# Patient Record
Sex: MALE | Race: OTHER | NOT HISPANIC OR LATINO | URBAN - METROPOLITAN AREA
[De-identification: names, ages, dates, MRNs, and addresses within clinical notes are randomized per-mention and may not be internally consistent; named-entity substitution may affect disease eponyms.]

---

## 2019-08-13 ENCOUNTER — OUTPATIENT (OUTPATIENT)
Dept: OUTPATIENT SERVICES | Facility: HOSPITAL | Age: 59
LOS: 1 days | End: 2019-08-13
Payer: MEDICAID

## 2019-08-13 VITALS
OXYGEN SATURATION: 99 % | WEIGHT: 147.93 LBS | RESPIRATION RATE: 14 BRPM | SYSTOLIC BLOOD PRESSURE: 102 MMHG | TEMPERATURE: 97 F | DIASTOLIC BLOOD PRESSURE: 70 MMHG | HEART RATE: 63 BPM | HEIGHT: 70 IN

## 2019-08-13 DIAGNOSIS — K40.20 BILATERAL INGUINAL HERNIA, WITHOUT OBSTRUCTION OR GANGRENE, NOT SPECIFIED AS RECURRENT: ICD-10-CM

## 2019-08-13 DIAGNOSIS — E11.9 TYPE 2 DIABETES MELLITUS WITHOUT COMPLICATIONS: ICD-10-CM

## 2019-08-13 LAB
ANION GAP SERPL CALC-SCNC: 15 MMO/L — HIGH (ref 7–14)
BUN SERPL-MCNC: 11 MG/DL — SIGNIFICANT CHANGE UP (ref 7–23)
CALCIUM SERPL-MCNC: 10.1 MG/DL — SIGNIFICANT CHANGE UP (ref 8.4–10.5)
CHLORIDE SERPL-SCNC: 97 MMOL/L — LOW (ref 98–107)
CO2 SERPL-SCNC: 26 MMOL/L — SIGNIFICANT CHANGE UP (ref 22–31)
CREAT SERPL-MCNC: 0.6 MG/DL — SIGNIFICANT CHANGE UP (ref 0.5–1.3)
GLUCOSE SERPL-MCNC: 131 MG/DL — HIGH (ref 70–99)
HBA1C BLD-MCNC: 7.9 % — HIGH (ref 4–5.6)
HCT VFR BLD CALC: 43.6 % — SIGNIFICANT CHANGE UP (ref 39–50)
HGB BLD-MCNC: 14.9 G/DL — SIGNIFICANT CHANGE UP (ref 13–17)
MCHC RBC-ENTMCNC: 27.9 PG — SIGNIFICANT CHANGE UP (ref 27–34)
MCHC RBC-ENTMCNC: 34.2 % — SIGNIFICANT CHANGE UP (ref 32–36)
MCV RBC AUTO: 81.6 FL — SIGNIFICANT CHANGE UP (ref 80–100)
NRBC # FLD: 0 K/UL — SIGNIFICANT CHANGE UP (ref 0–0)
PLATELET # BLD AUTO: 232 K/UL — SIGNIFICANT CHANGE UP (ref 150–400)
PMV BLD: 9.6 FL — SIGNIFICANT CHANGE UP (ref 7–13)
POTASSIUM SERPL-MCNC: 3.7 MMOL/L — SIGNIFICANT CHANGE UP (ref 3.5–5.3)
POTASSIUM SERPL-SCNC: 3.7 MMOL/L — SIGNIFICANT CHANGE UP (ref 3.5–5.3)
RBC # BLD: 5.34 M/UL — SIGNIFICANT CHANGE UP (ref 4.2–5.8)
RBC # FLD: 12.7 % — SIGNIFICANT CHANGE UP (ref 10.3–14.5)
SODIUM SERPL-SCNC: 138 MMOL/L — SIGNIFICANT CHANGE UP (ref 135–145)
WBC # BLD: 6.16 K/UL — SIGNIFICANT CHANGE UP (ref 3.8–10.5)
WBC # FLD AUTO: 6.16 K/UL — SIGNIFICANT CHANGE UP (ref 3.8–10.5)

## 2019-08-13 PROCEDURE — 93010 ELECTROCARDIOGRAM REPORT: CPT

## 2019-08-13 NOTE — H&P PST ADULT - NEGATIVE OPHTHALMOLOGIC SYMPTOMS
no photophobia/no pain R/no loss of vision L/no discharge L/no lacrimation R/no blurred vision L/no discharge R/no pain L/no irritation L/no irritation R/no loss of vision R/no lacrimation L/no blurred vision R/no diplopia

## 2019-08-13 NOTE — H&P PST ADULT - NEGATIVE GENERAL GENITOURINARY SYMPTOMS
no hematuria/normal urinary frequency/no renal colic/no urinary hesitancy/no urine discoloration/no flank pain R/no incontinence/no flank pain L/no gas in urine/no bladder infections/no dysuria normal urinary frequency/no nocturia/no urinary hesitancy/no flank pain R/no bladder infections/no urine discoloration/no flank pain L/no incontinence/no dysuria/no hematuria/no renal colic/no gas in urine

## 2019-08-13 NOTE — H&P PST ADULT - HISTORY OF PRESENT ILLNESS
59 y/o male with PMH: DMtype2, Dyslipidemia presents to PST for pre op evaluation with h/o left inguinal hernia x 15 years.  Pt c/o mild pain ans increased swelling after meals. Now scheduled for repair left scrotal hernia repair with mesh on 08/19/19

## 2019-08-13 NOTE — H&P PST ADULT - NEGATIVE CARDIOVASCULAR SYMPTOMS
no orthopnea/no palpitations/no claudication/no chest pain/no dyspnea on exertion/no paroxysmal nocturnal dyspnea/no peripheral edema

## 2019-08-13 NOTE — H&P PST ADULT - RS GEN PE MLT RESP DETAILS PC
respirations non-labored/no chest wall tenderness/no intercostal retractions/good air movement/no wheezes/airway patent/no rhonchi/breath sounds equal/clear to auscultation bilaterally/no rales

## 2019-08-13 NOTE — H&P PST ADULT - NEGATIVE GENERAL SYMPTOMS
no anorexia/no weight gain/no weight loss/no polyphagia/no polydipsia/no fatigue/no chills/no malaise

## 2019-08-13 NOTE — H&P PST ADULT - NSICDXPROBLEM_GEN_ALL_CORE_FT
PROBLEM DIAGNOSES  Problem: Bilateral inguinal hernia, without obstruction or gangrene, not specified as recurrent  Assessment and Plan: Scheduled for repair left scrotal hernia repair with mesh on 08/19/19. Pre op instructions, famotidine, chlorhexidine gluconate soap given and explained. Pt verbalized understanding.     Problem: Diabetes mellitus  Assessment and Plan: Monitor BS on day of surgery  Pt instructed to hold metformin 24 hours and Farxiga 48 hours prior to surgery. Also instructed not to take any diabetes medications on day of surgery. Pt verbalized understanding.

## 2019-08-13 NOTE — H&P PST ADULT - NEGATIVE GASTROINTESTINAL SYMPTOMS
no diarrhea/no jaundice/no nausea/no vomiting/no constipation/no hiccoughs no hiccoughs/no vomiting/no constipation/no diarrhea/no change in bowel habits/no nausea/no abdominal pain/no melena/no jaundice

## 2019-08-16 RX ORDER — SODIUM CHLORIDE 9 MG/ML
1000 INJECTION, SOLUTION INTRAVENOUS
Refills: 0 | Status: DISCONTINUED | OUTPATIENT
Start: 2019-08-19 | End: 2019-09-03

## 2019-08-18 ENCOUNTER — TRANSCRIPTION ENCOUNTER (OUTPATIENT)
Age: 59
End: 2019-08-18

## 2019-08-19 ENCOUNTER — RESULT REVIEW (OUTPATIENT)
Age: 59
End: 2019-08-19

## 2019-08-19 ENCOUNTER — OUTPATIENT (OUTPATIENT)
Dept: OUTPATIENT SERVICES | Facility: HOSPITAL | Age: 59
LOS: 1 days | Discharge: HOME | End: 2019-08-19
Payer: MEDICAID

## 2019-08-19 VITALS
OXYGEN SATURATION: 98 % | HEIGHT: 70 IN | TEMPERATURE: 98 F | RESPIRATION RATE: 15 BRPM | HEART RATE: 68 BPM | DIASTOLIC BLOOD PRESSURE: 72 MMHG | SYSTOLIC BLOOD PRESSURE: 100 MMHG | WEIGHT: 147.93 LBS

## 2019-08-19 VITALS
RESPIRATION RATE: 14 BRPM | DIASTOLIC BLOOD PRESSURE: 70 MMHG | HEART RATE: 78 BPM | OXYGEN SATURATION: 99 % | SYSTOLIC BLOOD PRESSURE: 126 MMHG

## 2019-08-19 DIAGNOSIS — K40.20 BILATERAL INGUINAL HERNIA, WITHOUT OBSTRUCTION OR GANGRENE, NOT SPECIFIED AS RECURRENT: ICD-10-CM

## 2019-08-19 LAB — GLUCOSE BLDC GLUCOMTR-MCNC: 142 MG/DL — HIGH (ref 70–99)

## 2019-08-19 PROCEDURE — 88302 TISSUE EXAM BY PATHOLOGIST: CPT | Mod: 26

## 2019-08-19 RX ORDER — OXYCODONE HYDROCHLORIDE 5 MG/1
5 TABLET ORAL EVERY 4 HOURS
Refills: 0 | Status: DISCONTINUED | OUTPATIENT
Start: 2019-08-19 | End: 2019-08-19

## 2019-08-19 RX ORDER — SODIUM CHLORIDE 9 MG/ML
1000 INJECTION, SOLUTION INTRAVENOUS
Refills: 0 | Status: DISCONTINUED | OUTPATIENT
Start: 2019-08-19 | End: 2019-09-03

## 2019-08-19 NOTE — ASU DISCHARGE PLAN (ADULT/PEDIATRIC) - CARE PROVIDER_API CALL
Peña Joya)  Surgery  [address] [place] [address]  Phone: [phone]  Fax: [phone]  Follow Up Time: 1 week    Wilman Ambrocio  Phone: [phone]  Fax: (   )    -  Follow Up Time: 1 week

## 2019-08-19 NOTE — ASU DISCHARGE PLAN (ADULT/PEDIATRIC) - CALL YOUR DOCTOR IF YOU HAVE ANY OF THE FOLLOWING:
Swelling that gets worse/Excessive diarrhea/Bleeding that does not stop/Pain not relieved by Medications/Increased irritability or sluggishness/Numbness, tingling, color or temperature change to extremity/Fever greater than (need to indicate Fahrenheit or Celsius)/Nausea and vomiting that does not stop/Unable to urinate/Inability to tolerate liquids or foods/Wound/Surgical Site with redness, or foul smelling discharge or pus

## 2019-08-19 NOTE — ASU DISCHARGE PLAN (ADULT/PEDIATRIC) - MEDICATION INSTRUCTIONS
You received IV Tylenol for pain management at 4:30PM. Please DO NOT take any Tylenol (Acetaminophen) containing products, such as Vicodin, Percocet, Excedrin, and cold medications for the next 6 hours until 10:30PM. DO NOT TAKE MORE THAN 3000 MG OF TYLENOL in a 24 hour period.

## 2019-08-19 NOTE — ASU DISCHARGE PLAN (ADULT/PEDIATRIC) - PROVIDER TOKENS
PROVIDER:[TOKEN:[[id number]],FOLLOWUP:[1 week]],FREE:[LAST:[Ambrocio],PHONE:[phone]],FAX:[(   )    -],ADDRESS:[[facility]],FOLLOWUP:[1 week]]

## 2019-08-19 NOTE — ASU DISCHARGE PLAN (ADULT/PEDIATRIC) - NURSING INSTRUCTIONS
You received IV Tylenol for pain management at 4:30PM. Please DO NOT take any Tylenol (Acetaminophen) containing products, such as Vicodin, Percocet, Excedrin, and cold medications for the next 6 hours until 10:30PM. DO NOT TAKE MORE THAN 3000 MG OF TYLENOL in a 24 hour period. You received IV Tylenol for pain management at 4:30PM. Please DO NOT take any Tylenol (Acetaminophen) containing products, such as Vicodin, Percocet, Excedrin, and cold medications for the next 6 hours until 10:30PM. DO NOT TAKE MORE THAN 3000 MG OF TYLENOL in a 24 hour period.  You received IV Toradol for pain management at 4pm. Please DO NOT take Motrin/Ibuprofen/Advil/Aleve/NSAIDs (Non-Steroidal Anti-Inflammatory Drugs) for the next 6 hours until 10PM.

## 2019-08-19 NOTE — BRIEF OPERATIVE NOTE - OPERATION/FINDINGS
left inguinal hernia repair with mesh  Left testicle undescended, intraoperative urology consult, brought as far down to scrotum as we could, put stitch through to keep in place. Plan to follow w/ urology

## 2019-08-19 NOTE — ASU DISCHARGE PLAN (ADULT/PEDIATRIC) - ASU DC SPECIAL INSTRUCTIONSFT
WOUND CARE:  Please keep incisions clean and dry. Please do not Scrub or rub incisions. Do not use lotion or powder on incisions. Please leave your steri strips on, they will fall off by themselves.   BATHING: Please do not submerge wound underwater. You may shower and/or sponge bathe.  PAIN: Please take motrin and tylenol. Please take percocet as needed for severe pain.   ACTIVITY: No heavy lifting or straining. Otherwise, you may return to your usual level of physical activity. If you are taking narcotic pain medication (such as Percocet) DO NOT drive a car, operate machinery or make important decisions.  DIET: Return to your usual diet.  NOTIFY YOUR SURGEON IF: You have any bleeding that does not stop, any pus draining from your wound(s), any fever (over 100.4 F) or chills, persistent nausea/vomiting, persistent diarrhea, or if your pain is not controlled on your discharge pain medications.  FOLLOW-UP: . Please follow-up with your surgeon, within 7 days following discharge- please call to schedule an appointment. Please call the [facility] and ask for Dr. Ambrocio to follow w/ a urologist.

## 2019-08-20 PROBLEM — E78.5 HYPERLIPIDEMIA, UNSPECIFIED: Chronic | Status: ACTIVE | Noted: 2019-08-13

## 2019-08-26 PROBLEM — Z00.00 ENCOUNTER FOR PREVENTIVE HEALTH EXAMINATION: Status: ACTIVE | Noted: 2019-08-26

## 2019-08-27 ENCOUNTER — APPOINTMENT (OUTPATIENT)
Dept: UROLOGY | Facility: CLINIC | Age: 59
End: 2019-08-27
Payer: MEDICAID

## 2019-08-27 VITALS
BODY MASS INDEX: 21.37 KG/M2 | HEIGHT: 68 IN | DIASTOLIC BLOOD PRESSURE: 82 MMHG | TEMPERATURE: 97.8 F | SYSTOLIC BLOOD PRESSURE: 121 MMHG | HEART RATE: 73 BPM | WEIGHT: 141 LBS

## 2019-08-27 DIAGNOSIS — Q53.10 UNSPECIFIED UNDESCENDED TESTICLE, UNILATERAL: ICD-10-CM

## 2019-08-27 DIAGNOSIS — R35.0 FREQUENCY OF MICTURITION: ICD-10-CM

## 2019-08-27 PROCEDURE — 51798 US URINE CAPACITY MEASURE: CPT

## 2019-08-27 PROCEDURE — 99204 OFFICE O/P NEW MOD 45 MIN: CPT | Mod: 25

## 2019-08-27 RX ORDER — METFORMIN HYDROCHLORIDE 500 MG/1
500 TABLET, COATED ORAL
Refills: 0 | Status: ACTIVE | COMMUNITY

## 2019-08-28 LAB — SURGICAL PATHOLOGY STUDY: SIGNIFICANT CHANGE UP

## 2019-08-28 NOTE — PHYSICAL EXAM
[General Appearance - Well Developed] : well developed [General Appearance - Well Nourished] : well nourished [Normal Appearance] : normal appearance [Well Groomed] : well groomed [General Appearance - In No Acute Distress] : no acute distress [Edema] : no peripheral edema [Exaggerated Use Of Accessory Muscles For Inspiration] : no accessory muscle use [Respiration, Rhythm And Depth] : normal respiratory rhythm and effort [Abdomen Soft] : soft [Abdomen Tenderness] : non-tender [Urethral Meatus] : meatus normal [Costovertebral Angle Tenderness] : no ~M costovertebral angle tenderness [Scrotum] : the scrotum was normal [Urinary Bladder Findings] : the bladder was normal on palpation [Normal Station and Gait] : the gait and station were normal for the patient's age [] : no rash [No Focal Deficits] : no focal deficits [Oriented To Time, Place, And Person] : oriented to person, place, and time [Affect] : the affect was normal [Not Anxious] : not anxious [Mood] : the mood was normal [No Palpable Adenopathy] : no palpable adenopathy [FreeTextEntry1] : see consult letter

## 2019-08-28 NOTE — HISTORY OF PRESENT ILLNESS
[FreeTextEntry1] : Sheikh Jesus presents to the office today. He is a 58 year old man who had hernia surgery one week ago with Dr. Joya to repair a left inguinal hernia. At the time of surgery, apparently it was discovered that there was a high riding testicle in the sublingual space. The patient was told that the testicle was thought to be underdeveloped and undescended based on the surgeon's evaluation at the time of the procedure. It was apparently sutured in place from what he was told but was unable to be brought down into the scrotum. The patient had apparently known of an undescended or retractile testicle in the past. He has fathered 4 children and has no reported history of any infertility issues. He has also never felt any pain in the area. He was diagnosed with the abnormal testicular location in [place]. This issue has apparently been known for years by the patient, but was unknown to the surgeon prior to the hernia repair.\par \par The patient has some symptoms of urinary frequency to have developed since the operation. She denies any major baseline urinary complaints including urgency or frequency and has never been an issue of urinary retention. He does have some baseline post void dribbling.\par

## 2019-08-28 NOTE — REVIEW OF SYSTEMS
[Feeling Tired] : feeling tired [Constipation] : constipation [Strong urge to urinate] : strong urge to urinate [Bladder pressure] : experiences bladder pressure [Bladder fullness after urinating] : bladder fullness after urinating [Skin Wound] : skin wound [Feelings Of Weakness] : feelings of weakness [Negative] : Heme/Lymph [FreeTextEntry3] : dribbling of raulito

## 2019-08-28 NOTE — ASSESSMENT
[FreeTextEntry1] : Examination of the testicle today was difficult because the patient is currently one week status post left inguinal hernia surgery and a reported location of the testicle was just below the external inguinal ring on the left side. Examination today does show that he has a mass at the lower aspect of the hernia incision site which is likely the testicle. There is no palpable testicle in the scrotum on the left side. The right testicle feels to be in its normal position with normal lie and orientation. There is no mass lesion palpable. The mass in the left sublingual space is approximately the size of a normal testicle I would estimate to be approximately 25 cm³ in size.\par \par At the age of 58, it is highly unlikely that he has an issue of testicular cancer. We discussed the difference between undescended testicles and retractile testicles and that his history of a large inguinal hernia could have something to do with the fact that the testicle was in an abnormal location. A recommendation to him that would be that after she has recovered from the hernia surgery, he undergo evaluation with an ultrasound of the testicles to assess for any mass lesions. I think this is likely going to show normal findings.\par \par As he is recently postop with some changes in urinary symptoms, I did perform a residual bladder scan today which showed he is emptying well was 0 mL. The patient does wish to have intervention for the symptoms of the post void dribbling and for this reason I did prescribe him tamsulosin 0.4 mg.

## 2019-09-13 ENCOUNTER — APPOINTMENT (OUTPATIENT)
Dept: MRI IMAGING | Facility: IMAGING CENTER | Age: 59
End: 2019-09-13
Payer: MEDICAID

## 2019-09-13 ENCOUNTER — OUTPATIENT (OUTPATIENT)
Dept: OUTPATIENT SERVICES | Facility: HOSPITAL | Age: 59
LOS: 1 days | End: 2019-09-13
Payer: MEDICAID

## 2019-09-13 DIAGNOSIS — R19.8 OTHER SPECIFIED SYMPTOMS AND SIGNS INVOLVING THE DIGESTIVE SYSTEM AND ABDOMEN: ICD-10-CM

## 2019-09-13 PROCEDURE — 72197 MRI PELVIS W/O & W/DYE: CPT

## 2019-09-13 PROCEDURE — A9585: CPT

## 2019-09-13 PROCEDURE — 72197 MRI PELVIS W/O & W/DYE: CPT | Mod: 26

## 2019-09-27 ENCOUNTER — RX CHANGE (OUTPATIENT)
Age: 59
End: 2019-09-27

## 2019-10-20 ENCOUNTER — FORM ENCOUNTER (OUTPATIENT)
Age: 59
End: 2019-10-20

## 2019-10-21 ENCOUNTER — APPOINTMENT (OUTPATIENT)
Dept: ULTRASOUND IMAGING | Facility: IMAGING CENTER | Age: 59
End: 2019-10-21
Payer: MEDICAID

## 2019-10-21 ENCOUNTER — OUTPATIENT (OUTPATIENT)
Dept: OUTPATIENT SERVICES | Facility: HOSPITAL | Age: 59
LOS: 1 days | End: 2019-10-21
Payer: MEDICAID

## 2019-10-21 DIAGNOSIS — Q53.10 UNSPECIFIED UNDESCENDED TESTICLE, UNILATERAL: ICD-10-CM

## 2019-10-21 PROCEDURE — 76870 US EXAM SCROTUM: CPT

## 2019-10-21 PROCEDURE — 76870 US EXAM SCROTUM: CPT | Mod: 26

## 2019-10-22 ENCOUNTER — APPOINTMENT (OUTPATIENT)
Dept: UROLOGY | Facility: CLINIC | Age: 59
End: 2019-10-22
Payer: MEDICAID

## 2019-10-22 DIAGNOSIS — Q55.22 RETRACTILE TESTIS: ICD-10-CM

## 2019-10-22 PROCEDURE — 99213 OFFICE O/P EST LOW 20 MIN: CPT

## 2020-02-10 ENCOUNTER — OUTPATIENT (OUTPATIENT)
Dept: OUTPATIENT SERVICES | Facility: HOSPITAL | Age: 60
LOS: 1 days | End: 2020-02-10
Payer: MEDICAID

## 2020-02-10 ENCOUNTER — RESULT REVIEW (OUTPATIENT)
Age: 60
End: 2020-02-10

## 2020-02-10 DIAGNOSIS — K62.89 OTHER SPECIFIED DISEASES OF ANUS AND RECTUM: ICD-10-CM

## 2020-02-10 PROCEDURE — 88321 CONSLTJ&REPRT SLD PREP ELSWR: CPT

## 2020-02-11 ENCOUNTER — APPOINTMENT (OUTPATIENT)
Dept: SURGICAL ONCOLOGY | Facility: CLINIC | Age: 60
End: 2020-02-11
Payer: MEDICAID

## 2020-02-11 VITALS
RESPIRATION RATE: 15 BRPM | DIASTOLIC BLOOD PRESSURE: 80 MMHG | HEIGHT: 68 IN | WEIGHT: 150 LBS | BODY MASS INDEX: 22.73 KG/M2 | SYSTOLIC BLOOD PRESSURE: 126 MMHG | HEART RATE: 66 BPM

## 2020-02-11 DIAGNOSIS — K62.89 OTHER SPECIFIED DISEASES OF ANUS AND RECTUM: ICD-10-CM

## 2020-02-11 PROCEDURE — 99205 OFFICE O/P NEW HI 60 MIN: CPT

## 2020-02-14 ENCOUNTER — OUTPATIENT (OUTPATIENT)
Dept: OUTPATIENT SERVICES | Facility: HOSPITAL | Age: 60
LOS: 1 days | Discharge: HOME | End: 2020-02-14
Payer: MEDICAID

## 2020-02-14 ENCOUNTER — APPOINTMENT (OUTPATIENT)
Dept: GASTROENTEROLOGY | Facility: HOSPITAL | Age: 60
End: 2020-02-14

## 2020-02-14 VITALS
TEMPERATURE: 98 F | WEIGHT: 149.91 LBS | HEART RATE: 65 BPM | OXYGEN SATURATION: 99 % | DIASTOLIC BLOOD PRESSURE: 78 MMHG | HEIGHT: 69 IN | SYSTOLIC BLOOD PRESSURE: 116 MMHG | RESPIRATION RATE: 15 BRPM

## 2020-02-14 VITALS
SYSTOLIC BLOOD PRESSURE: 124 MMHG | RESPIRATION RATE: 18 BRPM | DIASTOLIC BLOOD PRESSURE: 78 MMHG | OXYGEN SATURATION: 100 % | HEART RATE: 64 BPM

## 2020-02-14 DIAGNOSIS — K62.89 OTHER SPECIFIED DISEASES OF ANUS AND RECTUM: ICD-10-CM

## 2020-02-14 PROCEDURE — 45391 COLONOSCOPY W/ENDOSCOPE US: CPT | Mod: GC

## 2020-02-14 RX ORDER — SODIUM CHLORIDE 9 MG/ML
1000 INJECTION, SOLUTION INTRAVENOUS
Refills: 0 | Status: DISCONTINUED | OUTPATIENT
Start: 2020-02-14 | End: 2020-03-04

## 2020-02-14 RX ADMIN — SODIUM CHLORIDE 50 MILLILITER(S): 9 INJECTION, SOLUTION INTRAVENOUS at 16:34

## 2020-02-17 NOTE — ASSESSMENT
[FreeTextEntry1] : Assessment: 60 y/o male with 3.5 cm by 1.4 CM lower rectal mass concerning for rectal adenocarcinoma but pathology inconsistent with necrosis and atypical cells. \par \par \par Plan: \par Will order serum CEA today, CT abd/pelvis to evaluate for distant metastasis.. Patient will need a repeat biopsy to confirm suspected diagnosis- Discussed with - recommends - discussed for scheduling. Pt has an appt to f/u with Dr. Caceres (medical oncology).\par \par I have discussed the diagnosis, therapeutic plan and options with the patient at length. Patient expressed verbal understanding and all questions answered.\par \par \par 2/17/20\par EUS- no definite tumor seen in the rectum\par Will repeat MRI pelvis to compare to 9/19 study.\par Discussed with pt \par RTO after MRI

## 2020-02-17 NOTE — HISTORY OF PRESENT ILLNESS
[de-identified] : Mr. Lee is a 59 year old male who presents today for an initial consultation for rectal mass.  He  was referred by Dr. Elena Romano. Pt is asymptomatic. Denies abd pain, nausea/vomiting, no blood per rectum. Denies weight loss, fever, or chills. Pt had MRI pelvis in 9/19- which showed a midrectal tumor T2/3 N-.  Pt underwent flex sig which revealed a rectal mass about 8 CM from anal verge. Subsequently underwent EUS biopsy which showed atypical cells.  Slides were reviewed at [facility] and also noted blood clot, foci of necrosis and rare cluster of cells with intracytoplasmic content.\par \par PMHx: otherwise significant for  DM and HLD\par PSHx: left inguinal hernia repair\par \par GI: Elena Parikh [phone]\par PCP: Blu Saha  [phone]\par Son (Blanca) #[phone]

## 2020-02-17 NOTE — REASON FOR VISIT
[Initial Consultation] : an initial consultation for [Spouse] : spouse [FreeTextEntry2] : rectal mass

## 2020-02-17 NOTE — PHYSICAL EXAM
[Normal] : supple, no neck mass and thyroid not enlarged [Normal] : oriented to person, place and time, with appropriate affect [Normal Neck Lymph Nodes] : normal neck lymph nodes  [Normal Supraclavicular Lymph Nodes] : normal supraclavicular lymph nodes [FreeTextEntry1] : Rectal vault filled with stool, unable to palpate rectal mass. Stool brown, non-bloody. Sphincter tone normal.  [de-identified] : Soft, NT/ND. No palpable mass, no organomegaly

## 2020-02-17 NOTE — CONSULT LETTER
[Dear  ___] : Dear  [unfilled], [( Thank you for referring [unfilled] for consultation for _____ )] : Thank you for referring [unfilled] for consultation for [unfilled] [Consult Letter:] : I had the pleasure of evaluating your patient, [unfilled]. [Please see my note below.] : Please see my note below. [Consult Closing:] : Thank you very much for allowing me to participate in the care of this patient.  If you have any questions, please do not hesitate to contact me. [Sincerely,] : Sincerely, [DrYobani  ___] : Dr. GREWAL [FreeTextEntry3] : Ray Saunders MD, FICS, FACS\par , Surgical Oncology\par [facility] [place] [facility]\par [address]\[place]- [address]\par \par [address]\[place]- [address]\par \par [address]\[place], [place]- [address]\par \par (mob) [phone]\par (o) [phone]\par (f) [phone]\par

## 2020-02-19 ENCOUNTER — APPOINTMENT (OUTPATIENT)
Dept: CT IMAGING | Facility: IMAGING CENTER | Age: 60
End: 2020-02-19

## 2020-02-25 ENCOUNTER — APPOINTMENT (OUTPATIENT)
Dept: SURGICAL ONCOLOGY | Facility: CLINIC | Age: 60
End: 2020-02-25

## 2020-04-10 ENCOUNTER — APPOINTMENT (OUTPATIENT)
Dept: MRI IMAGING | Facility: IMAGING CENTER | Age: 60
End: 2020-04-10
Payer: MEDICAID

## 2020-04-10 ENCOUNTER — OUTPATIENT (OUTPATIENT)
Dept: OUTPATIENT SERVICES | Facility: HOSPITAL | Age: 60
LOS: 1 days | End: 2020-04-10
Payer: MEDICAID

## 2020-04-10 ENCOUNTER — APPOINTMENT (OUTPATIENT)
Dept: CT IMAGING | Facility: IMAGING CENTER | Age: 60
End: 2020-04-10
Payer: MEDICAID

## 2020-04-10 ENCOUNTER — RESULT REVIEW (OUTPATIENT)
Age: 60
End: 2020-04-10

## 2020-04-10 DIAGNOSIS — K62.89 OTHER SPECIFIED DISEASES OF ANUS AND RECTUM: ICD-10-CM

## 2020-04-10 PROCEDURE — 72197 MRI PELVIS W/O & W/DYE: CPT

## 2020-04-10 PROCEDURE — 72197 MRI PELVIS W/O & W/DYE: CPT | Mod: 26

## 2020-04-10 PROCEDURE — 74177 CT ABD & PELVIS W/CONTRAST: CPT

## 2020-04-10 PROCEDURE — 74177 CT ABD & PELVIS W/CONTRAST: CPT | Mod: 26

## 2020-04-10 PROCEDURE — A9585: CPT

## 2020-04-15 ENCOUNTER — APPOINTMENT (OUTPATIENT)
Dept: SURGICAL ONCOLOGY | Facility: CLINIC | Age: 60
End: 2020-04-15
Payer: MEDICAID

## 2020-04-15 ENCOUNTER — APPOINTMENT (OUTPATIENT)
Dept: MULTI SPECIALTY CLINIC | Facility: CLINIC | Age: 60
End: 2020-04-15

## 2020-04-15 PROCEDURE — 99214 OFFICE O/P EST MOD 30 MIN: CPT | Mod: 95

## 2020-04-15 PROCEDURE — 99204 OFFICE O/P NEW MOD 45 MIN: CPT | Mod: 95

## 2020-04-15 NOTE — CONSULT LETTER
[Dear  ___] : Dear  [unfilled], [Consult Letter:] : I had the pleasure of evaluating your patient, [unfilled]. [( Thank you for referring [unfilled] for consultation for _____ )] : Thank you for referring [unfilled] for consultation for [unfilled] [Please see my note below.] : Please see my note below. [Consult Closing:] : Thank you very much for allowing me to participate in the care of this patient.  If you have any questions, please do not hesitate to contact me. [Sincerely,] : Sincerely, [DrYobani  ___] : Dr. GREWAL [FreeTextEntry3] : Ray Saunders MD, FICS, FACS\par , Surgical Oncology\par [facility] [place] [facility] at [facility]\par [address]\[place]- [address]\par \par [address]\[place]- [address]\par \par [address]\[place]- [address]\par \par (mob) [phone]\par (o) [phone]\par (f) [phone]\par

## 2020-04-15 NOTE — ASSESSMENT
[FreeTextEntry1] : Assessment: 60 y/o male with 3.5 cm by 1.4 CM lower rectal mass concerning for rectal adenocarcinoma but pathology inconsistent with necrosis and atypical cells. \par \par \par Plan: \par Given the mew EUS and MRI findings along with his CT abd- suspicion for T2 rectal mass with lung nodules- rule out metastatic disease. Given the suspicion of rectal mass to be malignant and is difficult to to establish a tissue diagnosis with EUS biopsy X 2 and a suspicion of lung nodules on CT to be metastatic,I believe a PET scan would confirm PET avidity in the suspected primary and evaluate the metastatic burden at the same time.\par Will order PET scan.\par Plan discussed with pt and his son in detail.\par  notified\par I have discussed the diagnosis, therapeutic plan and options with the patient at length. Patient expressed verbal understanding to proceed with proposed plan. All questions answered.\par

## 2020-04-15 NOTE — HISTORY OF PRESENT ILLNESS
[Home] : at home, [unfilled] , at the time of the visit. [Other Location: e.g. Home (Enter Location, City,State)___] : at [unfilled] [Family Member] : family member [Patient] : the patient [Self] : self [de-identified] : Mr. Lee is a 59 year old male who presents today for an initial consultation for rectal mass.  He  was referred by Dr. Elena Romano. Pt is asymptomatic. Denies abd pain, nausea/vomiting, no blood per rectum. Denies weight loss, fever, or chills. Pt had MRI pelvis in 9/19- which showed a midrectal tumor T2/3 N-.  Pt underwent flex sig which revealed a rectal mass about 8 CM from anal verge. Subsequently underwent EUS biopsy which showed atypical cells.  Slides were reviewed at [facility] and also noted blood clot, foci of necrosis and rare cluster of cells with intracytoplasmic content.\par Patient seen via telehealth today after his repeat EUS and MRI pelvis. He reports no new symptoms.\par Repeat EUS- no mass seen in te rectum or sigmoid.\par CT abd/pelvis- no mesenteric or perirectal nodes, no liver masses , lung nodules- rule out metastasis\par Repeat MRI- Suspicious T2/T3 rectal mass- consistent with rectal wall thickening. Although no diffusion restriction seen. Findings appear improved compared to MRI from 9/19.\par \par PMHx: otherwise significant for  DM and HLD\par PSHx: left inguinal hernia repair\par \par GI: Elena Parikh [phone]\par PCP: Blu Saha  [phone]\par Son (Blanac) #[phone]

## 2020-06-19 ENCOUNTER — OUTPATIENT (OUTPATIENT)
Dept: OUTPATIENT SERVICES | Facility: HOSPITAL | Age: 60
LOS: 1 days | End: 2020-06-19
Payer: MEDICAID

## 2020-06-19 ENCOUNTER — APPOINTMENT (OUTPATIENT)
Dept: RADIOLOGY | Facility: IMAGING CENTER | Age: 60
End: 2020-06-19
Payer: MEDICAID

## 2020-06-19 DIAGNOSIS — Z00.8 ENCOUNTER FOR OTHER GENERAL EXAMINATION: ICD-10-CM

## 2020-06-19 PROCEDURE — 73502 X-RAY EXAM HIP UNI 2-3 VIEWS: CPT | Mod: 26,LT

## 2020-06-19 PROCEDURE — 73502 X-RAY EXAM HIP UNI 2-3 VIEWS: CPT

## 2020-06-27 ENCOUNTER — APPOINTMENT (OUTPATIENT)
Dept: NUCLEAR MEDICINE | Facility: IMAGING CENTER | Age: 60
End: 2020-06-27
Payer: MEDICAID

## 2020-06-27 ENCOUNTER — OUTPATIENT (OUTPATIENT)
Dept: OUTPATIENT SERVICES | Facility: HOSPITAL | Age: 60
LOS: 1 days | End: 2020-06-27
Payer: MEDICAID

## 2020-06-27 DIAGNOSIS — K62.89 OTHER SPECIFIED DISEASES OF ANUS AND RECTUM: ICD-10-CM

## 2020-06-27 PROCEDURE — 78815 PET IMAGE W/CT SKULL-THIGH: CPT | Mod: 26,PI

## 2020-06-27 PROCEDURE — 78815 PET IMAGE W/CT SKULL-THIGH: CPT

## 2020-06-27 PROCEDURE — A9552: CPT

## 2020-07-07 ENCOUNTER — APPOINTMENT (OUTPATIENT)
Dept: SURGICAL ONCOLOGY | Facility: CLINIC | Age: 60
End: 2020-07-07

## 2020-07-07 ENCOUNTER — APPOINTMENT (OUTPATIENT)
Dept: SURGICAL ONCOLOGY | Facility: CLINIC | Age: 60
End: 2020-07-07
Payer: MEDICAID

## 2020-07-07 PROCEDURE — 99214 OFFICE O/P EST MOD 30 MIN: CPT | Mod: 95

## 2020-07-07 NOTE — CONSULT LETTER
[Dear  ___] : Dear  [unfilled], [( Thank you for referring [unfilled] for consultation for _____ )] : Thank you for referring [unfilled] for consultation for [unfilled] [Consult Letter:] : I had the pleasure of evaluating your patient, [unfilled]. [Please see my note below.] : Please see my note below. [Sincerely,] : Sincerely, [Consult Closing:] : Thank you very much for allowing me to participate in the care of this patient.  If you have any questions, please do not hesitate to contact me. [DrYobani  ___] : Dr. GREWAL [FreeTextEntry3] : Ray Saunders MD, FICS, FACS\par , Surgical Oncology\par [facility] [place] [facility] at [facility]\par [address]\[place]- [address]\par \par [address]\[place]- [address]\par \par [address]\[place]- [address]\par \par (mob) [phone]\par (o) [phone]\par (f) [phone]\par

## 2020-07-07 NOTE — ASSESSMENT
[FreeTextEntry1] : Assessment: 58 y/o male with 3.5 cm by 1.4 CM lower rectal mass concerning for rectal adenocarcinoma but pathology inconsistent with necrosis and atypical cells. \par \par \par Plan: \par The PET suggests a lung nodule with mediastinal lymph nodes and the rectal mass is not clearly seen. Given his clinical assessment and lack of demonstration of an obvious rectal mass on MRI pelvis / PET scan and EUS- I have discussed with  to perform a repeat sigmoidoscopy and EUS to confirm his findings.\par I have referred  to  to evaluate the lung nodule with mediastinal lymphadenopathy.\par I have discussed the diagnosis, therapeutic plan and options with the patient at length. Patient expressed verbal understanding to proceed with proposed plan. All questions answered.\par RTO in 1 month with biopsy reports from repeat EUS and possible EBUS for mediastinal lymphadenopathy.

## 2020-07-07 NOTE — HISTORY OF PRESENT ILLNESS
[Home] : at home, [unfilled] , at the time of the visit. [Medical Office: ([facility])___] : at the medical office located in  [Family Member] : family member [Patient] : the patient [Self] : self [de-identified] : Mr. Lee is a 59 year old male who presents today for an initial consultation for rectal mass.  He  was referred by Dr. Elena Romano. \par Pt is asymptomatic. Denies abd pain, nausea/vomiting, no blood per rectum. Denies weight loss, fever, or chills.Reports weight gain \par Pt had MRI pelvis in 9/19- which showed a midrectal tumor T2/3 N-.  Pt underwent flex sig which revealed a rectal mass about 8 CM from anal verge. Subsequently underwent EUS biopsy which showed atypical cells.  \par Slides were reviewed at [facility] and also noted blood clot, foci of necrosis and rare cluster of cells with \par intracytoplasmic content.\par \par Patient seen via telehealth today after his repeat PET scan. He reports no new symptoms.\par PET 6/27/20 \par FDG avid lingular pulmonary nodule, increased in size since CT April 10, 2020. New left lower lobe pulmonary nodule since CT April 10, 2020 as well as FDG avid right upper lobe nodule. \par Bulky, FDG avid mediastinal and hilar lymphadenopathy and subcentimeter left supraclavicular lymph nodes, suspicious for malignancy. \par Small and large bowel FDG avidity due to metformin treatment makes evaluation for discrete bowel lesions difficult. Focal area of FDG avidity generally corresponding to area of thickening in the high rectum/rectosigmoid colon seen on MRI, without definite correlate on CT, remains indeterminate. \par Minimally FDG avid portacaval lymph node and subcentimeter retroperitoneal lymph nodes are indeterminate. \par \par \par 4/16/20 Repeat EUS- no mass seen in te rectum or sigmoid.\par CT abd/pelvis- no mesenteric or perirectal nodes, no liver masses , lung nodules- rule out metastasis\par Repeat MRI- Suspicious T2/T3 rectal mass- consistent with rectal wall thickening. Although no diffusion restriction seen. Findings appear improved compared to MRI from 9/19.\par \par PMHx: otherwise significant for  DM and HLD\par PSHx: left inguinal hernia repair\par \par GI: Elena Parikh [phone]\par PCP: Blu Saha  [phone]\par Son (Blanca) #[phone]

## 2020-07-07 NOTE — PHYSICAL EXAM
[Normal] : oriented to person, place and time, with appropriate affect [FreeTextEntry1] : Comprehensive exam unable to be performed as this is a Telehealth visit.

## 2020-07-09 ENCOUNTER — APPOINTMENT (OUTPATIENT)
Dept: THORACIC SURGERY | Facility: CLINIC | Age: 60
End: 2020-07-09
Payer: MEDICAID

## 2020-07-09 DIAGNOSIS — R91.1 SOLITARY PULMONARY NODULE: ICD-10-CM

## 2020-07-09 PROCEDURE — 99443: CPT

## 2020-07-09 NOTE — REASON FOR VISIT
[Consultation] : a consultation visit [Verbal consent obtained from patient] : the patient, [unfilled] [[employer] ] : provided by [employer]   [FreeTextEntry4] : Mima Garcia NP [FreeTextEntry1] : [id number] [TWNoteComboBox1] : Michelle

## 2020-07-09 NOTE — CONSULT LETTER
[Consult Letter:] : I had the pleasure of evaluating your patient, [unfilled]. [Please see my note below.] : Please see my note below. [Consult Closing:] : Thank you very much for allowing me to participate in the care of this patient.  If you have any questions, please do not hesitate to contact me. [Sincerely,] : Sincerely, [FreeTextEntry2] : Dr. HANNY Saunders (SurgOnc/ref)\par Dr. Blu Saha (PCP)\par Dr. Elena Romano (GI)\par Dr. Caceres (MedOnc)\par  [FreeTextEntry3] : \par \par \par Josiane Valdivia\par Attending Surgeon\par Division of Thoracic Surgery\par \par [employer] [facility]

## 2020-07-09 NOTE — ASSESSMENT
[FreeTextEntry1] : 59 year old male, evaluation of lung nodule, ref: Dr. Saunders.  \par \par PET/CT on 6/27/2020 reveals:\par - FDG avid left supraclavicular lymph node 0.6 cm (SUV 3.0)\par - FDG avid enlarged bilateral hilar and mediastinal lymph nodes, including: difficult to delineate right hilar (SUV 9.8), Subcarinal (2.6 x 1.9 cm, SUV 12.3), left lower paratracheal/hilar (SUV 9.3)\par - FDG avid difficult to delineate RUL 0.7 cm nodule (SUV 5.6).\par - New LLL 0.9 cm minimally nodule (SUV 2.1)\par - Increased size of FDG avid lingular pulmonary nodule adjacent to pericardium, 1.2 x 0.9 cm, SUV 4.8 (previously 0.9 cm).\par - Previously identified RLL groundglass nodule not definitively identified\par - Minimally FDG avid subcentimeter retroaortic lymph node (0.4 cm, SUV 2.9) and portacaval node (0.9 cm, SUV 3.6)\par - FDG avidity throughout the rectosigmoid colon and small bowel loops, probably due to metformin, which makes evaluation for primary colonic lesion difficult.\par - Focal area of FDG avidity generally corresponding to area of thickening in the high rectum/rectosigmoid colon seen on MRI, without definite correlate on CT (SUV 9.8)\par - FDG avid focus in the region of the left hip fovea (SUV 3.2), no FDG avid osseous lesions\par - Minimally FDG avid triangular soft tissue thickening left inguinal region probably due to hernia repair or undescended left testis (SUV 2.8)\par \par I have reviewed the patient's medical records and diagnostic images during the time of this office visit, and I have made the following recommendation:\par 1.  Flexible Bronchoscopy, Endobronchial Ultrasound, Possible Mediastinoscopy\par 2.  The risks, benefits, and alternatives to the procedure were discussed with the patient at length. He verbalized understanding and is in agreement with the above treatment plan. \par \par \par I personally performed the services described in the documentation, reviewed the documentation recorded by the scribe in my presence and it accurately and completely records my words and actions.\par \par I, Mima Garcia, am scribing for and the presence of WILLIAM Hamilton the following sections HISTORY OF PRESENT ILLNESSES, PAST MEDICAL/FAMILY/SOCIAL HISTORY; REVIEW OF SYSTEMS; VITAL SIGNS; PHYSICAL EXAM; DISPOSITION.

## 2020-07-09 NOTE — HISTORY OF PRESENT ILLNESS
[FreeTextEntry1] : Mr. Lee is a 59 year old male who presents today for evaluation of lung nodule, referred by SurgOnc Dr. HANNY Saunders.  He is a never smoker with a history of BPH + DM.  \par \par He had MRI pelvis in September 2019 which showed a midrectal tumor, prompted flex sig which revealed a rectal mass about 8 CM from anal verge, subsequently underwent EUS biopsy which showed atypical cells. Slides were reviewed at [facility] and also noted blood clot, foci of necrosis and rare cluster of cells with intracytoplasmic content.\par \par Repeat Lower EUS on 2/14/2020, no mass seen in the rectum or sigmoid.  CT Abd/Pelvis on 4/10/2020 no mesenteric or perirectal nodes, no liver masses, lung nodules rule out metastasis\par \par Repeat MRI on 4/16/2020 report states: apparent present rectal mass does not have similar location and appearance to previously described mass.  No evidence of extramural disease or adenopathy.  Overall findings suggest "mass" may be an artifact related to peristalsis/under distention.\par \par PET/CT on 6/27/2020 reveals:\par - FDG avid left supraclavicular lymph node 0.6 cm (SUV 3.0)\par - FDG avid enlarged bilateral hilar and mediastinal lymph nodes, including: difficult to delineate right hilar (SUV 9.8), Subcarinal (2.6 x 1.9 cm, SUV 12.3), left lower paratracheal/hilar (SUV 9.3)\par - FDG avid difficult to delineate RUL 0.7 cm nodule (SUV 5.6).\par - New LLL 0.9 cm minimally nodule (SUV 2.1)\par - Increased size of FDG avid lingular pulmonary nodule adjacent to pericardium, 1.2 x 0.9 cm, SUV 4.8 (previously 0.9 cm).\par - Previously identified RLL groundglass nodule not definitively identified\par - Minimally FDG avid subcentimeter retroaortic lymph node (0.4 cm, SUV 2.9) and portacaval node (0.9 cm, SUV 3.6)\par - FDG avidity throughout the rectosigmoid colon and small bowel loops, probably due to metformin, which makes evaluation for primary colonic lesion difficult.\par - Focal area of FDG avidity generally corresponding to area of thickening in the high rectum/rectosigmoid colon seen on MRI, without definite correlate on CT (SUV 9.8)\par - FDG avid focus in the region of the left hip fovea (SUV 3.2), no FDG avid osseous lesions\par - Minimally FDG avid triangular soft tissue thickening left inguinal region probably due to hernia repair or undescended left testis (SUV 2.8)\par

## 2020-07-23 ENCOUNTER — OUTPATIENT (OUTPATIENT)
Dept: OUTPATIENT SERVICES | Facility: HOSPITAL | Age: 60
LOS: 1 days | End: 2020-07-23
Payer: MEDICAID

## 2020-07-23 VITALS
TEMPERATURE: 99 F | SYSTOLIC BLOOD PRESSURE: 128 MMHG | DIASTOLIC BLOOD PRESSURE: 80 MMHG | OXYGEN SATURATION: 98 % | RESPIRATION RATE: 16 BRPM | HEIGHT: 71 IN | HEART RATE: 75 BPM | WEIGHT: 151.9 LBS

## 2020-07-23 DIAGNOSIS — R91.1 SOLITARY PULMONARY NODULE: ICD-10-CM

## 2020-07-23 DIAGNOSIS — Z98.890 OTHER SPECIFIED POSTPROCEDURAL STATES: Chronic | ICD-10-CM

## 2020-07-23 DIAGNOSIS — E11.9 TYPE 2 DIABETES MELLITUS WITHOUT COMPLICATIONS: ICD-10-CM

## 2020-07-23 DIAGNOSIS — R59.9 ENLARGED LYMPH NODES, UNSPECIFIED: ICD-10-CM

## 2020-07-23 LAB
ANION GAP SERPL CALC-SCNC: 14 MMO/L — SIGNIFICANT CHANGE UP (ref 7–14)
BASOPHILS # BLD AUTO: 0.04 K/UL — SIGNIFICANT CHANGE UP (ref 0–0.2)
BASOPHILS NFR BLD AUTO: 0.6 % — SIGNIFICANT CHANGE UP (ref 0–2)
BLD GP AB SCN SERPL QL: NEGATIVE — SIGNIFICANT CHANGE UP
BUN SERPL-MCNC: 21 MG/DL — SIGNIFICANT CHANGE UP (ref 7–23)
CALCIUM SERPL-MCNC: 9.6 MG/DL — SIGNIFICANT CHANGE UP (ref 8.4–10.5)
CHLORIDE SERPL-SCNC: 101 MMOL/L — SIGNIFICANT CHANGE UP (ref 98–107)
CO2 SERPL-SCNC: 25 MMOL/L — SIGNIFICANT CHANGE UP (ref 22–31)
CREAT SERPL-MCNC: 0.87 MG/DL — SIGNIFICANT CHANGE UP (ref 0.5–1.3)
EOSINOPHIL # BLD AUTO: 0.22 K/UL — SIGNIFICANT CHANGE UP (ref 0–0.5)
EOSINOPHIL NFR BLD AUTO: 3.4 % — SIGNIFICANT CHANGE UP (ref 0–6)
GLUCOSE SERPL-MCNC: 156 MG/DL — HIGH (ref 70–99)
HBA1C BLD-MCNC: 8 % — HIGH (ref 4–5.6)
HCT VFR BLD CALC: 43.1 % — SIGNIFICANT CHANGE UP (ref 39–50)
HGB BLD-MCNC: 14.3 G/DL — SIGNIFICANT CHANGE UP (ref 13–17)
IMM GRANULOCYTES NFR BLD AUTO: 0.5 % — SIGNIFICANT CHANGE UP (ref 0–1.5)
LYMPHOCYTES # BLD AUTO: 1.95 K/UL — SIGNIFICANT CHANGE UP (ref 1–3.3)
LYMPHOCYTES # BLD AUTO: 30.3 % — SIGNIFICANT CHANGE UP (ref 13–44)
MCHC RBC-ENTMCNC: 28.4 PG — SIGNIFICANT CHANGE UP (ref 27–34)
MCHC RBC-ENTMCNC: 33.2 % — SIGNIFICANT CHANGE UP (ref 32–36)
MCV RBC AUTO: 85.5 FL — SIGNIFICANT CHANGE UP (ref 80–100)
MONOCYTES # BLD AUTO: 0.67 K/UL — SIGNIFICANT CHANGE UP (ref 0–0.9)
MONOCYTES NFR BLD AUTO: 10.4 % — SIGNIFICANT CHANGE UP (ref 2–14)
NEUTROPHILS # BLD AUTO: 3.52 K/UL — SIGNIFICANT CHANGE UP (ref 1.8–7.4)
NEUTROPHILS NFR BLD AUTO: 54.8 % — SIGNIFICANT CHANGE UP (ref 43–77)
NRBC # FLD: 0 K/UL — SIGNIFICANT CHANGE UP (ref 0–0)
PLATELET # BLD AUTO: 232 K/UL — SIGNIFICANT CHANGE UP (ref 150–400)
PMV BLD: 9.9 FL — SIGNIFICANT CHANGE UP (ref 7–13)
POTASSIUM SERPL-MCNC: 4 MMOL/L — SIGNIFICANT CHANGE UP (ref 3.5–5.3)
POTASSIUM SERPL-SCNC: 4 MMOL/L — SIGNIFICANT CHANGE UP (ref 3.5–5.3)
RBC # BLD: 5.04 M/UL — SIGNIFICANT CHANGE UP (ref 4.2–5.8)
RBC # FLD: 13.1 % — SIGNIFICANT CHANGE UP (ref 10.3–14.5)
RH IG SCN BLD-IMP: POSITIVE — SIGNIFICANT CHANGE UP
SODIUM SERPL-SCNC: 140 MMOL/L — SIGNIFICANT CHANGE UP (ref 135–145)
WBC # BLD: 6.43 K/UL — SIGNIFICANT CHANGE UP (ref 3.8–10.5)
WBC # FLD AUTO: 6.43 K/UL — SIGNIFICANT CHANGE UP (ref 3.8–10.5)

## 2020-07-23 PROCEDURE — 93010 ELECTROCARDIOGRAM REPORT: CPT

## 2020-07-23 RX ORDER — TAMSULOSIN HYDROCHLORIDE 0.4 MG/1
1 CAPSULE ORAL
Qty: 0 | Refills: 0 | DISCHARGE

## 2020-07-23 RX ORDER — ASPIRIN/CALCIUM CARB/MAGNESIUM 324 MG
1 TABLET ORAL
Qty: 0 | Refills: 0 | DISCHARGE

## 2020-07-23 RX ORDER — SODIUM CHLORIDE 9 MG/ML
1000 INJECTION, SOLUTION INTRAVENOUS
Refills: 0 | Status: DISCONTINUED | OUTPATIENT
Start: 2020-07-28 | End: 2020-08-12

## 2020-07-23 NOTE — H&P PST ADULT - NEGATIVE NEUROLOGICAL SYMPTOMS
no paresthesias/no weakness/no tremors/no focal seizures/no transient paralysis/no generalized seizures/no syncope/no vertigo

## 2020-07-23 NOTE — H&P PST ADULT - NSICDXPROBLEM_GEN_ALL_CORE_FT
PROBLEM DIAGNOSES  Problem: Enlarged lymph nodes  Assessment and Plan:     Problem: Diabetes mellitus  Assessment and Plan: PROBLEM DIAGNOSES  Problem: Enlarged lymph nodes  Assessment and Plan: Procedure as booked ; Flexible Bronchoscopy , Endobronchial Ultrasound with Cytology , Possible Medianstinoscopy   Pt is a poor historian ; pt to review pre op with surgeon  Pre op instructions reviewed with assistance of [place]  Christiano # [id number] ; reviewed  Hibiclens with teach back ; pt verbalized good understanding of pre op instructions  Pt to Dr Schmitt for pre op medical clearance; pt on asa ; pt unsure why asa was initiated;; pre op asa instructions as per Dr Schmitt      Problem: Diabetes mellitus  Assessment and Plan: BMP, HGB done 7/23/2020  Pt to hold Glimepiride dos  Pt to hold Metformin evening prior tp surgery and dos  Pt to hold Farziga > 48 hours pre op  Pt to review plan for diabetic medications pre op with Dr Schmitt  FS dos PROBLEM DIAGNOSES  Problem: Enlarged lymph nodes  Assessment and Plan: Procedure as booked ; Flexible Bronchoscopy , Endobronchial Ultrasound with Cytology , Possible Mediastinoscopy   Pt is a poor historian ; pt to review surgery  pre op with surgeon  Pre op instructions reviewed with assistance of [place]  Christiano # [id number] ; reviewed  Hibiclens with teach back ; pt verbalized good understanding of pre op instructions  Pt to Dr Schmitt for pre op medical clearance; pt on asa ; pt unsure why asa was initiated;; pre op asa instructions as per Dr Schmitt  Regarding transfusion ; pt would accept only in a life threatening emergency ; pt to review with surgeon pre op       Problem: Diabetes mellitus  Assessment and Plan: BMP, HGB done 7/23/2020  Pt to hold Glimepiride dos  Pt to hold Metformin evening prior tp surgery and dos  Pt to hold Farziga > 48 hours pre op  Pt to review plan for diabetic medications pre op with Dr Schmitt  FS dos

## 2020-07-23 NOTE — H&P PST ADULT - NEGATIVE BREAST SYMPTOMS
no breast tenderness R/no breast lump L/no breast tenderness L/no breast lump R/no nipple discharge R/no nipple discharge L

## 2020-07-23 NOTE — H&P PST ADULT - LYMPHATIC
supraclavicular R/posterior cervical L/anterior cervical L/anterior cervical R/supraclavicular L/posterior cervical R

## 2020-07-23 NOTE — H&P PST ADULT - RS GEN PE MLT RESP DETAILS PC
respirations non-labored/good air movement/no rhonchi/breath sounds equal/clear to auscultation bilaterally/no intercostal retractions/no wheezes/no rales/no chest wall tenderness/airway patent

## 2020-07-23 NOTE — H&P PST ADULT - NEGATIVE GENERAL SYMPTOMS
no chills/no malaise/no fatigue/no polydipsia/no polyphagia/no weight loss/no weight gain/no anorexia

## 2020-07-23 NOTE — H&P PST ADULT - PATIENT ON (OXYGEN DELIVERY METHOD)
GENERAL SURGERY CONSULTATION/OFFICE VISIT      Patient's Name/ Date of Birth/ Gender: Marv Cooley / 1998 (27 y.o.) / female     PCP: KIRSTIN Alvarenga CNP    History of present Illness:  Patient is a pleasant 27 y.o. female  kindly referred by KIRSTIN Chen CNP for left knee skin lesion, patient has shaved it off before, it recurs. She fell on a treadmill age 3 and it appeared and persisted ever since then she and her mother mention. Otherwise healthy 28 yo. Past Medical History:  has a past medical history of Anxiety and Menometrorrhagia. Past Surgical History: History reviewed. No pertinent surgical history. Social History:  reports that she has never smoked. She has never used smokeless tobacco. She reports that she does not drink alcohol or use drugs. Family History: family history includes Breast Cancer in an other family member. Review of Systems:   General: Denies fever, chills, night sweats, weight loss, malaise, fatigue  HEENT: Denies sore throat, sinus problems, allergic rhinosinusitis  Card: Denies chest pain, palpitations, orthopnea/PND. HTN.   Pulm: Denies cough, shortness of breath, dyspnea on exertion  GI:  neg  : Denies polyuria, dysuria, hematuria  Endo: neg  Heme: neg  Neuro: Denies h/o CVA, TIA  Skin: Denies rashes, ulcers  Musculoskeletal: no gross motor dysfunction    Allergies: Albuterol and Diflucan [fluconazole]    Current Meds:  Current Outpatient Medications:     cetirizine (ZYRTEC) 10 MG tablet, TAKE 1 TABLET BY MOUTH EVERY DAY, Disp: 90 tablet, Rfl: 1    PARoxetine (PAXIL) 20 MG tablet, TAKE 1 TABLET BY MOUTH EVERY DAY IN THE MORNING, Disp: 90 tablet, Rfl: 1    medroxyPROGESTERone (DEPO-PROVERA) 150 MG/ML injection, Inject 150 mg into the muscle every 3 months, Disp: , Rfl:     diclofenac (VOLTAREN) 75 MG EC tablet, Take 1 tablet by mouth 2 times daily as needed for Pain (Patient not taking: Reported on 7/31/2019), Disp: 60 tablet, Rfl: 0   room air

## 2020-07-23 NOTE — H&P PST ADULT - NEGATIVE GASTROINTESTINAL SYMPTOMS
no vomiting/no hiccoughs/no constipation/no jaundice/no nausea/no diarrhea/no change in bowel habits/no abdominal pain/no melena

## 2020-07-23 NOTE — H&P PST ADULT - NSICDXPASTMEDICALHX_GEN_ALL_CORE_FT
PAST MEDICAL HISTORY:  DM (diabetes mellitus)     Dyslipidemia PAST MEDICAL HISTORY:  DM (diabetes mellitus) -160    Dyslipidemia PAST MEDICAL HISTORY:  DM (diabetes mellitus) -160    Dyslipidemia     History of BPH RX tamsulosin ; pt denies use    Lung nodule

## 2020-07-23 NOTE — H&P PST ADULT - NEGATIVE OPHTHALMOLOGIC SYMPTOMS
no blurred vision L/no discharge L/no diplopia/no pain R/no loss of vision L/no loss of vision R/no lacrimation R/no discharge R/no lacrimation L/no pain L/no irritation L/no blurred vision R/no irritation R/no photophobia

## 2020-07-23 NOTE — H&P PST ADULT - NSICDXPASTSURGICALHX_GEN_ALL_CORE_FT
No significant past surgical history PAST SURGICAL HISTORY:  S/P inguinal hernia repair LIH repair 12/19

## 2020-07-23 NOTE — H&P PST ADULT - HISTORY OF PRESENT ILLNESS
Pt is a 59 y.o. male ; 57 y/o male with PMH: DMtype2, Dyslipidemia presents to PST for pre op . Information Pt is a 59 y.o. male ;information obtained with assistance of [place]  Christiano [id number] 57 y/o male with PMH: DMtype2, Dyslipidemia presents to PST for pre op . Information Pt is a 59 y.o. male ;information obtained with assistance of "[employer]  Christiano [id number] 57 y/o male with PMH: DMtype2, Dyslipidemia presents to PST for pre op . Information obtained from pt via "[employer] . Pt is a poor historian ; pt scheduled for Flexible Bronchoscopy  Ultrasound with Cytology Possible Mediastinoscopy Pt is a 59 y.o. male ;information obtained with assistance of [place] [employer]  Christiano [id number] . Pt is a poor historian ; pt scheduled for Flexible Bronchoscopy  Ultrasound with Cytology Possible Mediastinoscopy

## 2020-07-25 ENCOUNTER — APPOINTMENT (OUTPATIENT)
Dept: DISASTER EMERGENCY | Facility: CLINIC | Age: 60
End: 2020-07-25

## 2020-07-25 DIAGNOSIS — Z01.818 ENCOUNTER FOR OTHER PREPROCEDURAL EXAMINATION: ICD-10-CM

## 2020-07-26 LAB — SARS-COV-2 N GENE NPH QL NAA+PROBE: NOT DETECTED

## 2020-07-27 NOTE — ASU PATIENT PROFILE, ADULT - PMH
DM (diabetes mellitus)  -160  Dyslipidemia    History of BPH  RX tamsulosin ; pt denies use  Lung nodule

## 2020-07-28 ENCOUNTER — RESULT REVIEW (OUTPATIENT)
Age: 60
End: 2020-07-28

## 2020-07-28 ENCOUNTER — OUTPATIENT (OUTPATIENT)
Dept: OUTPATIENT SERVICES | Facility: HOSPITAL | Age: 60
LOS: 1 days | Discharge: HOME | End: 2020-07-28
Payer: MEDICAID

## 2020-07-28 ENCOUNTER — APPOINTMENT (OUTPATIENT)
Dept: THORACIC SURGERY | Facility: HOSPITAL | Age: 60
End: 2020-07-28

## 2020-07-28 VITALS
RESPIRATION RATE: 17 BRPM | SYSTOLIC BLOOD PRESSURE: 105 MMHG | OXYGEN SATURATION: 98 % | HEIGHT: 71 IN | WEIGHT: 151.9 LBS | TEMPERATURE: 99 F | DIASTOLIC BLOOD PRESSURE: 66 MMHG | HEART RATE: 64 BPM

## 2020-07-28 VITALS
OXYGEN SATURATION: 98 % | SYSTOLIC BLOOD PRESSURE: 128 MMHG | RESPIRATION RATE: 18 BRPM | DIASTOLIC BLOOD PRESSURE: 60 MMHG | TEMPERATURE: 97 F | HEART RATE: 62 BPM

## 2020-07-28 DIAGNOSIS — R91.1 SOLITARY PULMONARY NODULE: ICD-10-CM

## 2020-07-28 DIAGNOSIS — Z98.890 OTHER SPECIFIED POSTPROCEDURAL STATES: Chronic | ICD-10-CM

## 2020-07-28 LAB — RH IG SCN BLD-IMP: POSITIVE — SIGNIFICANT CHANGE UP

## 2020-07-28 PROCEDURE — 39402 MEDIASTINOSCPY W/LMPH NOD BX: CPT

## 2020-07-28 PROCEDURE — 31645 BRNCHSC W/THER ASPIR 1ST: CPT

## 2020-07-28 PROCEDURE — 88188 FLOWCYTOMETRY/READ 9-15: CPT | Mod: 59

## 2020-07-28 PROCEDURE — 88331 PATH CONSLTJ SURG 1 BLK 1SPC: CPT | Mod: 26

## 2020-07-28 PROCEDURE — 31624 DX BRONCHOSCOPE/LAVAGE: CPT

## 2020-07-28 PROCEDURE — 88312 SPECIAL STAINS GROUP 1: CPT | Mod: 26

## 2020-07-28 PROCEDURE — 31652 BRONCH EBUS SAMPLNG 1/2 NODE: CPT

## 2020-07-28 PROCEDURE — 88305 TISSUE EXAM BY PATHOLOGIST: CPT | Mod: 26

## 2020-07-28 PROCEDURE — 88172 CYTP DX EVAL FNA 1ST EA SITE: CPT | Mod: 26

## 2020-07-28 PROCEDURE — 88305 TISSUE EXAM BY PATHOLOGIST: CPT | Mod: 26,59

## 2020-07-28 PROCEDURE — 71045 X-RAY EXAM CHEST 1 VIEW: CPT | Mod: 26

## 2020-07-28 PROCEDURE — 88312 SPECIAL STAINS GROUP 1: CPT | Mod: 26,59

## 2020-07-28 PROCEDURE — 88173 CYTOPATH EVAL FNA REPORT: CPT | Mod: 26

## 2020-07-28 RX ORDER — ONDANSETRON 8 MG/1
4 TABLET, FILM COATED ORAL ONCE
Refills: 0 | Status: DISCONTINUED | OUTPATIENT
Start: 2020-07-28 | End: 2020-08-12

## 2020-07-28 RX ORDER — FENTANYL CITRATE 50 UG/ML
25 INJECTION INTRAVENOUS
Refills: 0 | Status: DISCONTINUED | OUTPATIENT
Start: 2020-07-28 | End: 2020-07-28

## 2020-07-28 RX ADMIN — FENTANYL CITRATE 25 MICROGRAM(S): 50 INJECTION INTRAVENOUS at 13:55

## 2020-07-28 RX ADMIN — FENTANYL CITRATE 25 MICROGRAM(S): 50 INJECTION INTRAVENOUS at 13:39

## 2020-07-28 NOTE — ASU DISCHARGE PLAN (ADULT/PEDIATRIC) - FOLLOW UP APPOINTMENTS
911 or go to the nearest Emergency Room [facility] (Adult): [facility] (Adult):/may also call Recovery Room (PACU) 24/7 @ [phone]

## 2020-07-28 NOTE — BRIEF OPERATIVE NOTE - NSICDXBRIEFPROCEDURE_GEN_ALL_CORE_FT
PROCEDURES:  Mediastinoscopy 28-Jul-2020 12:11:34  Agustin Bah  Bronchoscopy, with EBUS and 1 or 2 lymph node sampling 28-Jul-2020 12:11:20  Agustin Bah

## 2020-07-28 NOTE — ASU DISCHARGE PLAN (ADULT/PEDIATRIC) - CALL YOUR DOCTOR IF YOU HAVE ANY OF THE FOLLOWING:
101F/Fever greater than (need to indicate Fahrenheit or Celsius) Swelling that gets worse/101F/Bleeding that does not stop/Unable to urinate/Wound/Surgical Site with redness, or foul smelling discharge or pus/Nausea and vomiting that does not stop/Fever greater than (need to indicate Fahrenheit or Celsius)

## 2020-07-28 NOTE — ASU DISCHARGE PLAN (ADULT/PEDIATRIC) - CARE PROVIDER_API CALL
Josiane Valdivia E  SURGERY  [address] [place] [address]  Phone: [phone]  Fax: [phone]  Follow Up Time:

## 2020-07-28 NOTE — ASU DISCHARGE PLAN (ADULT/PEDIATRIC) - COMMENTS
Please, call Thoracic Surgery office at [phone] and schedule an appointment with your doctor in 2 weeks.

## 2020-07-29 PROBLEM — E11.9 TYPE 2 DIABETES MELLITUS WITHOUT COMPLICATIONS: Chronic | Status: ACTIVE | Noted: 2019-08-13

## 2020-07-29 PROBLEM — Z87.438 PERSONAL HISTORY OF OTHER DISEASES OF MALE GENITAL ORGANS: Chronic | Status: ACTIVE | Noted: 2020-07-23

## 2020-07-29 PROBLEM — R91.1 SOLITARY PULMONARY NODULE: Chronic | Status: ACTIVE | Noted: 2020-07-23

## 2020-08-06 ENCOUNTER — APPOINTMENT (OUTPATIENT)
Dept: THORACIC SURGERY | Facility: CLINIC | Age: 60
End: 2020-08-06
Payer: MEDICAID

## 2020-08-06 VITALS
OXYGEN SATURATION: 97 % | BODY MASS INDEX: 22.96 KG/M2 | SYSTOLIC BLOOD PRESSURE: 102 MMHG | DIASTOLIC BLOOD PRESSURE: 67 MMHG | HEART RATE: 62 BPM | RESPIRATION RATE: 16 BRPM | TEMPERATURE: 98.3 F | WEIGHT: 151 LBS

## 2020-08-06 DIAGNOSIS — R59.0 LOCALIZED ENLARGED LYMPH NODES: ICD-10-CM

## 2020-08-06 PROCEDURE — 99214 OFFICE O/P EST MOD 30 MIN: CPT

## 2020-08-06 RX ORDER — TAMSULOSIN HYDROCHLORIDE 0.4 MG/1
0.4 CAPSULE ORAL
Qty: 30 | Refills: 3 | Status: DISCONTINUED | COMMUNITY
Start: 2019-08-27 | End: 2020-08-06

## 2020-08-06 RX ORDER — SODIUM SULFATE, POTASSIUM SULFATE, MAGNESIUM SULFATE 17.5; 3.13; 1.6 G/ML; G/ML; G/ML
17.5-3.13-1.6 SOLUTION, CONCENTRATE ORAL
Qty: 1 | Refills: 0 | Status: DISCONTINUED | COMMUNITY
Start: 2020-02-12 | End: 2020-08-06

## 2020-08-06 RX ORDER — GLIPIZIDE 2.5 MG/1
TABLET ORAL
Refills: 0 | Status: ACTIVE | COMMUNITY

## 2020-08-06 RX ORDER — DAPAGLIFLOZIN 10 MG/1
TABLET, FILM COATED ORAL
Refills: 0 | Status: DISCONTINUED | COMMUNITY
End: 2020-08-06

## 2020-08-06 NOTE — HISTORY OF PRESENT ILLNESS
[FreeTextEntry1] : Mr. Lee is a 59 year old male who presents today for follow up. He was initially referred by SurgOnc Dr. HANNY Saunders. He is a never smoker with a history of BPH + DM. \par \par He had MRI pelvis in September 2019 which showed a midrectal tumor, prompted flex sig which revealed a rectal mass about 8 CM from anal verge, subsequently underwent EUS biopsy which showed atypical cells. Slides were reviewed at [facility] and also noted blood clot, foci of necrosis and rare cluster of cells with intracytoplasmic content.\par \par Repeat Lower EUS on 2/14/2020, no mass seen in the rectum or sigmoid. CT Abd/Pelvis on 4/10/2020 no mesenteric or perirectal nodes, no liver masses, lung nodules rule out metastasis\par \par Repeat MRI on 4/16/2020 report states: apparent present rectal mass does not have similar location and appearance to previously described mass. No evidence of extramural disease or adenopathy. Overall findings suggest "mass" may be an artifact related to peristalsis/under distention.\par \par PET/CT on 6/27/2020 reveals:\par - FDG avid left supraclavicular lymph node 0.6 cm (SUV 3.0)\par - FDG avid enlarged bilateral hilar and mediastinal lymph nodes, including: difficult to delineate right hilar (SUV 9.8), Subcarinal (2.6 x 1.9 cm, SUV 12.3), left lower paratracheal/hilar (SUV 9.3)\par - FDG avid difficult to delineate RUL 0.7 cm nodule (SUV 5.6).\par - New LLL 0.9 cm minimally nodule (SUV 2.1)\par - Increased size of FDG avid lingular pulmonary nodule adjacent to pericardium, 1.2 x 0.9 cm, SUV 4.8 (previously 0.9 cm).\par - Previously identified RLL groundglass nodule not definitively identified\par - Minimally FDG avid subcentimeter retroaortic lymph node (0.4 cm, SUV 2.9) and portacaval node (0.9 cm, SUV 3.6)\par - FDG avidity throughout the rectosigmoid colon and small bowel loops, probably due to metformin, which makes evaluation for primary colonic lesion difficult.\par - Focal area of FDG avidity generally corresponding to area of thickening in the high rectum/rectosigmoid colon seen on MRI, without definite correlate on CT (SUV 9.8)\par - FDG avid focus in the region of the left hip fovea (SUV 3.2), no FDG avid osseous lesions\par - Minimally FDG avid triangular soft tissue thickening left inguinal region probably due to hernia repair or undescended left testis (SUV 2.8)\par \par He is now s/p Flexible bronchoscopy, endobronchial ultrasound, mediastinoscopy on 7/28/2020. Pathology reveals LN tissue with non necrotizing granulomas. Negative for malignant cells. AFB and GMS stains negative. \par \par The patient denies shortness of breath, fever, chills, dysphagia or hemoptysis.

## 2020-08-06 NOTE — ASSESSMENT
[FreeTextEntry1] : Mr. Lee is a 59 year old male who presents today for follow up. He was initially referred by SurgOnc Dr. HANNY Saunders. He is a never smoker with a history of BPH + DM. \par \par He had MRI pelvis in September 2019 which showed a midrectal tumor, prompted flex sig which revealed a rectal mass about 8 CM from anal verge, subsequently underwent EUS biopsy which showed atypical cells. Slides were reviewed at [facility] and also noted blood clot, foci of necrosis and rare cluster of cells with intracytoplasmic content.\par \par Repeat Lower EUS on 2/14/2020, no mass seen in the rectum or sigmoid. CT Abd/Pelvis on 4/10/2020 no mesenteric or perirectal nodes, no liver masses, lung nodules rule out metastasis\par \par Repeat MRI on 4/16/2020 report states: apparent present rectal mass does not have similar location and appearance to previously described mass. No evidence of extramural disease or adenopathy. Overall findings suggest "mass" may be an artifact related to peristalsis/under distention.\par \par PET/CT on 6/27/2020 reveals:\par - FDG avid left supraclavicular lymph node 0.6 cm (SUV 3.0)\par - FDG avid enlarged bilateral hilar and mediastinal lymph nodes, including: difficult to delineate right hilar (SUV 9.8), Subcarinal (2.6 x 1.9 cm, SUV 12.3), left lower paratracheal/hilar (SUV 9.3)\par - FDG avid difficult to delineate RUL 0.7 cm nodule (SUV 5.6).\par - New LLL 0.9 cm minimally nodule (SUV 2.1)\par - Increased size of FDG avid lingular pulmonary nodule adjacent to pericardium, 1.2 x 0.9 cm, SUV 4.8 (previously 0.9 cm).\par - Previously identified RLL groundglass nodule not definitively identified\par - Minimally FDG avid subcentimeter retroaortic lymph node (0.4 cm, SUV 2.9) and portacaval node (0.9 cm, SUV 3.6)\par - FDG avidity throughout the rectosigmoid colon and small bowel loops, probably due to metformin, which makes evaluation for primary colonic lesion difficult.\par - Focal area of FDG avidity generally corresponding to area of thickening in the high rectum/rectosigmoid colon seen on MRI, without definite correlate on CT (SUV 9.8)\par - FDG avid focus in the region of the left hip fovea (SUV 3.2), no FDG avid osseous lesions\par - Minimally FDG avid triangular soft tissue thickening left inguinal region probably due to hernia repair or undescended left testis (SUV 2.8)\par \par He is now s/p Flexible bronchoscopy, endobronchial ultrasound, mediastinoscopy on 7/28/2020. Pathology reveals LN tissue with non necrotizing granulomas. Negative for malignant cells. AFB and GMS stains negative. \par \par I have reviewed the pathology and images with the patient and made the following recommendations. Follow up with PCP. No further visits are required at this time, however, the patient may return to the office as needed if any issues arise. \par \par Written by Dalila Ivy NP, acting as a scribe for Dr. Josiane Valdivia.\par The documentation recorded by the scribe accurately reflects the service I personally performed and the decisions made by me. Josiane Valdivia MD

## 2020-10-23 ENCOUNTER — APPOINTMENT (OUTPATIENT)
Dept: UROLOGY | Facility: CLINIC | Age: 60
End: 2020-10-23

## 2021-03-23 LAB — CEA SERPL-MCNC: 3.9 NG/ML

## 2021-06-05 ENCOUNTER — APPOINTMENT (OUTPATIENT)
Dept: DISASTER EMERGENCY | Facility: OTHER | Age: 61
End: 2021-06-05
Payer: MEDICAID

## 2021-06-05 PROCEDURE — 0012A: CPT

## 2021-09-14 NOTE — ASU PREOP CHECKLIST - HAIR REMOVAL
Patient was discharged in stable condition. Discharge instructions given. Patient was picked up by the [facility].   
hair removal not indicated

## 2021-11-10 VITALS
TEMPERATURE: 98 F | RESPIRATION RATE: 17 BRPM | HEART RATE: 64 BPM | OXYGEN SATURATION: 99 % | WEIGHT: 149.91 LBS | SYSTOLIC BLOOD PRESSURE: 102 MMHG | DIASTOLIC BLOOD PRESSURE: 97 MMHG | HEIGHT: 69 IN

## 2021-11-10 RX ORDER — CHLORHEXIDINE GLUCONATE 213 G/1000ML
1 SOLUTION TOPICAL ONCE
Refills: 0 | Status: DISCONTINUED | OUTPATIENT
Start: 2021-11-15 | End: 2021-11-16

## 2021-11-10 NOTE — H&P ADULT - NSHPLABSRESULTS_GEN_ALL_CORE
14.0   7.07  )-----------( 223      ( 15 Nov 2021 16:00 )             42.6       11-15    137  |  99  |  14  ----------------------------<  66<L>  4.0   |  30  |  0.82    Ca    10.6<H>      15 Nov 2021 16:00    TPro  8.2  /  Alb  5.1<H>  /  TBili  0.5  /  DBili  x   /  AST  23  /  ALT  23  /  AlkPhos  61  11-15

## 2021-11-10 NOTE — H&P ADULT - HISTORY OF PRESENT ILLNESS
COVID @ 11/12 [facility]   Cardiologist: Dr. Saha   Escort spouse     Confirm meds on arrival     Patient is a 60 y/o male with FHx of CAD and PMHx of DM who presented to cardiologist Dr. Saha c/o LAST. Patient endorses LAST upon ambulating 3-4 blocks which occurred for 1-2 years, but reports now associated with on-radiating SS chest pressure for past year improving with rest. He denies palpitations, syncope, PND/orthopnea, or LE edema. Also denies fever, chills, N/V/D, URI symptoms, recent illness, travel, or sick contacts. Per MD note Stress Echo 09/2021 moderate hypokinesis appreciated in the mid-basal inferior walls at peak stress. Per MD note echo EF 55-60%, grade I diastolic dysfunction consistent with impaired relaxation, moderate calcification of AV, mild AS, mild MR/TR.     In light of patient's risk factors, CCS III anginal symptoms, and abnormal stress echo patient is referred for cardiac catheterization with possible intervention if clinically indicated to r/o CAD.   COVID: Negative, in patient's chart  Cardiologist: Dr. Saha  Pharmacy: [address], [phone]  Escort: Spouse    62 y/o male w/ FHx CAD and PMHx HTN,, HLD, and type II DM who presented to cardiologist, Dr. Saha, c/o LAST upon ambulating 3-4 blocks occurring for 1-2 years but reports now is associated w/ non-radiating substernal chest pressure for the past year and improving w/ rest. Patient denies palpitations, syncope, PND/orthopnea, LE edema, fever, chills, nausea/vomiting/diarrhea, URI symptoms, recent illness, travel, or sick contacts. Per MD note, Stress Echocardiogram (09/2021) showed moderate hypokinesis appreciated in the mid-basal inferior walls at peak stress. Per MD note Echocardiogram revealed EF 55-60%, grade I diastolic dysfunction c/w impaired relaxation, moderate calcification of AV, mild AS, and mild MR/TR.     In light of patient's risk factors, CCS Class III anginal equivalent symptoms, and abnormal stress echocardiogram results, patient now presents for cardiac catheterization w/ possible intervention.

## 2021-11-10 NOTE — H&P ADULT - ASSESSMENT
62 y/o male w/ FHx CAD and PMHx HTN,, HLD, and type II DM who presents for cardiac catheterization w/ possible intervention if clinically indicated in light of patient's risk factors, CCS Class III anginal symptoms, and abnormal Stress Echocardiogram results.     Labs on arrival within normal limits. Initial EKG was inadequate and a repeat EKG was instructed to be performed. Patient reported taking all of his regular daily home medications prior to arrival, including Aspirin 81 mg daily and Metformin 500 mg BID. Patient was ordered for Plavix 600 mg PO once and  cc/hour x 4 hours.   					  ASA: III		Mallampati class: III	            Anginal Class: III    Sedation Plan: Moderate    Patient Is Suitable Candidate For Sedation: Yes    Risks & benefits of procedure and sedation and risks and benefits for the alternative therapy have been explained to the patient in layman’s terms including but not limited to: allergic reaction, bleeding, infection, arrhythmia, respiratory compromise, renal and vascular compromise, limb damage, MI, CVA, emergent CABG/Vascular Surgery and death. Informed consent obtained and in chart.

## 2021-11-10 NOTE — H&P ADULT - NSICDXPASTMEDICALHX_GEN_ALL_CORE_FT
PAST MEDICAL HISTORY:  DM (diabetes mellitus) -160    Dyslipidemia     History of BPH RX tamsulosin ; pt denies use    Lung nodule

## 2021-11-15 ENCOUNTER — INPATIENT (INPATIENT)
Facility: HOSPITAL | Age: 61
LOS: 0 days | Discharge: HOME | DRG: 247 | End: 2021-11-16
Attending: INTERNAL MEDICINE | Admitting: INTERNAL MEDICINE
Payer: MEDICAID

## 2021-11-15 ENCOUNTER — TRANSCRIPTION ENCOUNTER (OUTPATIENT)
Age: 61
End: 2021-11-15

## 2021-11-15 DIAGNOSIS — Z98.890 OTHER SPECIFIED POSTPROCEDURAL STATES: Chronic | ICD-10-CM

## 2021-11-15 LAB
A1C WITH ESTIMATED AVERAGE GLUCOSE RESULT: 7.3 % — HIGH (ref 4–5.6)
ALBUMIN SERPL ELPH-MCNC: 5.1 G/DL — HIGH (ref 3.3–5)
ALP SERPL-CCNC: 61 U/L — SIGNIFICANT CHANGE UP (ref 40–120)
ALT FLD-CCNC: 23 U/L — SIGNIFICANT CHANGE UP (ref 10–45)
ANION GAP SERPL CALC-SCNC: 8 MMOL/L — SIGNIFICANT CHANGE UP (ref 5–17)
APTT BLD: 35 SEC — SIGNIFICANT CHANGE UP (ref 27.5–35.5)
AST SERPL-CCNC: 23 U/L — SIGNIFICANT CHANGE UP (ref 10–40)
BASOPHILS # BLD AUTO: 0.04 K/UL — SIGNIFICANT CHANGE UP (ref 0–0.2)
BASOPHILS NFR BLD AUTO: 0.6 % — SIGNIFICANT CHANGE UP (ref 0–2)
BILIRUB SERPL-MCNC: 0.5 MG/DL — SIGNIFICANT CHANGE UP (ref 0.2–1.2)
BUN SERPL-MCNC: 14 MG/DL — SIGNIFICANT CHANGE UP (ref 7–23)
CALCIUM SERPL-MCNC: 10.6 MG/DL — HIGH (ref 8.4–10.5)
CHLORIDE SERPL-SCNC: 99 MMOL/L — SIGNIFICANT CHANGE UP (ref 96–108)
CHOLEST SERPL-MCNC: 144 MG/DL — SIGNIFICANT CHANGE UP
CK MB CFR SERPL CALC: 3.8 NG/ML — SIGNIFICANT CHANGE UP (ref 0–6.7)
CK SERPL-CCNC: 179 U/L — SIGNIFICANT CHANGE UP (ref 30–200)
CO2 SERPL-SCNC: 30 MMOL/L — SIGNIFICANT CHANGE UP (ref 22–31)
CREAT SERPL-MCNC: 0.82 MG/DL — SIGNIFICANT CHANGE UP (ref 0.5–1.3)
EOSINOPHIL # BLD AUTO: 0.19 K/UL — SIGNIFICANT CHANGE UP (ref 0–0.5)
EOSINOPHIL NFR BLD AUTO: 2.7 % — SIGNIFICANT CHANGE UP (ref 0–6)
ESTIMATED AVERAGE GLUCOSE: 163 MG/DL — HIGH (ref 68–114)
GLUCOSE BLDC GLUCOMTR-MCNC: 155 MG/DL — HIGH (ref 70–99)
GLUCOSE BLDC GLUCOMTR-MCNC: 64 MG/DL — LOW (ref 70–99)
GLUCOSE BLDC GLUCOMTR-MCNC: 94 MG/DL — SIGNIFICANT CHANGE UP (ref 70–99)
GLUCOSE SERPL-MCNC: 66 MG/DL — LOW (ref 70–99)
HCT VFR BLD CALC: 42.6 % — SIGNIFICANT CHANGE UP (ref 39–50)
HDLC SERPL-MCNC: 48 MG/DL — SIGNIFICANT CHANGE UP
HGB BLD-MCNC: 14 G/DL — SIGNIFICANT CHANGE UP (ref 13–17)
IMM GRANULOCYTES NFR BLD AUTO: 0.4 % — SIGNIFICANT CHANGE UP (ref 0–1.5)
INR BLD: 0.96 — SIGNIFICANT CHANGE UP (ref 0.88–1.16)
LIPID PNL WITH DIRECT LDL SERPL: 72 MG/DL — SIGNIFICANT CHANGE UP
LYMPHOCYTES # BLD AUTO: 2.91 K/UL — SIGNIFICANT CHANGE UP (ref 1–3.3)
LYMPHOCYTES # BLD AUTO: 41.2 % — SIGNIFICANT CHANGE UP (ref 13–44)
MCHC RBC-ENTMCNC: 27.6 PG — SIGNIFICANT CHANGE UP (ref 27–34)
MCHC RBC-ENTMCNC: 32.9 GM/DL — SIGNIFICANT CHANGE UP (ref 32–36)
MCV RBC AUTO: 83.9 FL — SIGNIFICANT CHANGE UP (ref 80–100)
MONOCYTES # BLD AUTO: 0.72 K/UL — SIGNIFICANT CHANGE UP (ref 0–0.9)
MONOCYTES NFR BLD AUTO: 10.2 % — SIGNIFICANT CHANGE UP (ref 2–14)
NEUTROPHILS # BLD AUTO: 3.18 K/UL — SIGNIFICANT CHANGE UP (ref 1.8–7.4)
NEUTROPHILS NFR BLD AUTO: 44.9 % — SIGNIFICANT CHANGE UP (ref 43–77)
NON HDL CHOLESTEROL: 96 MG/DL — SIGNIFICANT CHANGE UP
NRBC # BLD: 0 /100 WBCS — SIGNIFICANT CHANGE UP (ref 0–0)
PLATELET # BLD AUTO: 223 K/UL — SIGNIFICANT CHANGE UP (ref 150–400)
POTASSIUM SERPL-MCNC: 4 MMOL/L — SIGNIFICANT CHANGE UP (ref 3.5–5.3)
POTASSIUM SERPL-SCNC: 4 MMOL/L — SIGNIFICANT CHANGE UP (ref 3.5–5.3)
PROT SERPL-MCNC: 8.2 G/DL — SIGNIFICANT CHANGE UP (ref 6–8.3)
PROTHROM AB SERPL-ACNC: 11.5 SEC — SIGNIFICANT CHANGE UP (ref 10.6–13.6)
RBC # BLD: 5.08 M/UL — SIGNIFICANT CHANGE UP (ref 4.2–5.8)
RBC # FLD: 12.5 % — SIGNIFICANT CHANGE UP (ref 10.3–14.5)
SODIUM SERPL-SCNC: 137 MMOL/L — SIGNIFICANT CHANGE UP (ref 135–145)
TRIGL SERPL-MCNC: 119 MG/DL — SIGNIFICANT CHANGE UP
WBC # BLD: 7.07 K/UL — SIGNIFICANT CHANGE UP (ref 3.8–10.5)
WBC # FLD AUTO: 7.07 K/UL — SIGNIFICANT CHANGE UP (ref 3.8–10.5)

## 2021-11-15 RX ORDER — EMPAGLIFLOZIN 10 MG/1
1 TABLET, FILM COATED ORAL
Qty: 0 | Refills: 0 | DISCHARGE

## 2021-11-15 RX ORDER — CLOPIDOGREL BISULFATE 75 MG/1
75 TABLET, FILM COATED ORAL DAILY
Refills: 0 | Status: DISCONTINUED | OUTPATIENT
Start: 2021-11-16 | End: 2021-11-16

## 2021-11-15 RX ORDER — SODIUM CHLORIDE 9 MG/ML
500 INJECTION INTRAMUSCULAR; INTRAVENOUS; SUBCUTANEOUS
Refills: 0 | Status: DISCONTINUED | OUTPATIENT
Start: 2021-11-15 | End: 2021-11-15

## 2021-11-15 RX ORDER — GLUCAGON INJECTION, SOLUTION 0.5 MG/.1ML
1 INJECTION, SOLUTION SUBCUTANEOUS ONCE
Refills: 0 | Status: DISCONTINUED | OUTPATIENT
Start: 2021-11-15 | End: 2021-11-16

## 2021-11-15 RX ORDER — TADALAFIL 10 MG/1
1 TABLET, FILM COATED ORAL
Qty: 0 | Refills: 0 | DISCHARGE

## 2021-11-15 RX ORDER — DAPAGLIFLOZIN 10 MG/1
1 TABLET, FILM COATED ORAL
Qty: 0 | Refills: 0 | DISCHARGE

## 2021-11-15 RX ORDER — DEXTROSE 50 % IN WATER 50 %
12.5 SYRINGE (ML) INTRAVENOUS ONCE
Refills: 0 | Status: DISCONTINUED | OUTPATIENT
Start: 2021-11-15 | End: 2021-11-16

## 2021-11-15 RX ORDER — DEXTROSE 50 % IN WATER 50 %
25 SYRINGE (ML) INTRAVENOUS ONCE
Refills: 0 | Status: DISCONTINUED | OUTPATIENT
Start: 2021-11-15 | End: 2021-11-16

## 2021-11-15 RX ORDER — FENOFIBRATE,MICRONIZED 130 MG
1 CAPSULE ORAL
Qty: 0 | Refills: 0 | DISCHARGE

## 2021-11-15 RX ORDER — METOPROLOL TARTRATE 50 MG
1 TABLET ORAL
Qty: 0 | Refills: 0 | DISCHARGE

## 2021-11-15 RX ORDER — SODIUM CHLORIDE 9 MG/ML
500 INJECTION INTRAMUSCULAR; INTRAVENOUS; SUBCUTANEOUS
Refills: 0 | Status: DISCONTINUED | OUTPATIENT
Start: 2021-11-15 | End: 2021-11-16

## 2021-11-15 RX ORDER — CLOPIDOGREL BISULFATE 75 MG/1
600 TABLET, FILM COATED ORAL ONCE
Refills: 0 | Status: COMPLETED | OUTPATIENT
Start: 2021-11-15 | End: 2021-11-15

## 2021-11-15 RX ORDER — GLIMEPIRIDE 1 MG
1 TABLET ORAL
Qty: 0 | Refills: 0 | DISCHARGE

## 2021-11-15 RX ORDER — ATORVASTATIN CALCIUM 80 MG/1
1 TABLET, FILM COATED ORAL
Qty: 0 | Refills: 0 | DISCHARGE

## 2021-11-15 RX ORDER — DEXTROSE 50 % IN WATER 50 %
15 SYRINGE (ML) INTRAVENOUS ONCE
Refills: 0 | Status: DISCONTINUED | OUTPATIENT
Start: 2021-11-15 | End: 2021-11-16

## 2021-11-15 RX ORDER — ASPIRIN/CALCIUM CARB/MAGNESIUM 324 MG
1 TABLET ORAL
Qty: 0 | Refills: 0 | DISCHARGE

## 2021-11-15 RX ORDER — METFORMIN HYDROCHLORIDE 850 MG/1
1 TABLET ORAL
Qty: 0 | Refills: 0 | DISCHARGE

## 2021-11-15 RX ORDER — SODIUM CHLORIDE 9 MG/ML
1000 INJECTION, SOLUTION INTRAVENOUS
Refills: 0 | Status: DISCONTINUED | OUTPATIENT
Start: 2021-11-15 | End: 2021-11-16

## 2021-11-15 RX ORDER — INSULIN LISPRO 100/ML
VIAL (ML) SUBCUTANEOUS
Refills: 0 | Status: DISCONTINUED | OUTPATIENT
Start: 2021-11-15 | End: 2021-11-16

## 2021-11-15 RX ORDER — METOPROLOL TARTRATE 50 MG
25 TABLET ORAL DAILY
Refills: 0 | Status: DISCONTINUED | OUTPATIENT
Start: 2021-11-16 | End: 2021-11-16

## 2021-11-15 RX ORDER — ATORVASTATIN CALCIUM 80 MG/1
40 TABLET, FILM COATED ORAL AT BEDTIME
Refills: 0 | Status: DISCONTINUED | OUTPATIENT
Start: 2021-11-15 | End: 2021-11-16

## 2021-11-15 RX ORDER — FENOFIBRATE,MICRONIZED 130 MG
145 CAPSULE ORAL DAILY
Refills: 0 | Status: DISCONTINUED | OUTPATIENT
Start: 2021-11-15 | End: 2021-11-16

## 2021-11-15 RX ORDER — ERGOCALCIFEROL 1.25 MG/1
1 CAPSULE ORAL
Qty: 0 | Refills: 0 | DISCHARGE

## 2021-11-15 RX ORDER — DEXTROSE 50 % IN WATER 50 %
25 SYRINGE (ML) INTRAVENOUS ONCE
Refills: 0 | Status: COMPLETED | OUTPATIENT
Start: 2021-11-15 | End: 2021-11-15

## 2021-11-15 RX ORDER — ASPIRIN/CALCIUM CARB/MAGNESIUM 324 MG
81 TABLET ORAL DAILY
Refills: 0 | Status: DISCONTINUED | OUTPATIENT
Start: 2021-11-16 | End: 2021-11-16

## 2021-11-15 RX ADMIN — CLOPIDOGREL BISULFATE 600 MILLIGRAM(S): 75 TABLET, FILM COATED ORAL at 17:00

## 2021-11-15 RX ADMIN — SODIUM CHLORIDE 125 MILLILITER(S): 9 INJECTION INTRAMUSCULAR; INTRAVENOUS; SUBCUTANEOUS at 17:00

## 2021-11-15 RX ADMIN — Medication 25 GRAM(S): at 16:21

## 2021-11-15 RX ADMIN — Medication 145 MILLIGRAM(S): at 23:58

## 2021-11-15 RX ADMIN — Medication 2: at 23:42

## 2021-11-15 RX ADMIN — ATORVASTATIN CALCIUM 40 MILLIGRAM(S): 80 TABLET, FILM COATED ORAL at 23:42

## 2021-11-15 NOTE — PATIENT PROFILE ADULT - NSPROSPHOSPCHAPLAINYN_GEN_A_NUR
Chief Complaint   Patient presents with     Recheck Medication     pt here to follow up on colonoscopy and endoscopy       Carlitos Moss CMA, EMT at 11:36 AM on 9/25/2020.   
no

## 2021-11-16 ENCOUNTER — TRANSCRIPTION ENCOUNTER (OUTPATIENT)
Age: 61
End: 2021-11-16

## 2021-11-16 VITALS — TEMPERATURE: 97 F

## 2021-11-16 LAB
ANION GAP SERPL CALC-SCNC: 12 MMOL/L — SIGNIFICANT CHANGE UP (ref 5–17)
BUN SERPL-MCNC: 14 MG/DL — SIGNIFICANT CHANGE UP (ref 7–23)
CALCIUM SERPL-MCNC: 9.7 MG/DL — SIGNIFICANT CHANGE UP (ref 8.4–10.5)
CHLORIDE SERPL-SCNC: 102 MMOL/L — SIGNIFICANT CHANGE UP (ref 96–108)
CO2 SERPL-SCNC: 25 MMOL/L — SIGNIFICANT CHANGE UP (ref 22–31)
COVID-19 NUCLEOCAPSID GAM AB INTERP: POSITIVE
COVID-19 NUCLEOCAPSID TOTAL GAM ANTIBODY RESULT: 35.4 INDEX — HIGH
COVID-19 SPIKE DOMAIN AB INTERP: POSITIVE
COVID-19 SPIKE DOMAIN ANTIBODY RESULT: >250 U/ML — HIGH
CREAT SERPL-MCNC: 0.79 MG/DL — SIGNIFICANT CHANGE UP (ref 0.5–1.3)
GLUCOSE BLDC GLUCOMTR-MCNC: 73 MG/DL — SIGNIFICANT CHANGE UP (ref 70–99)
GLUCOSE SERPL-MCNC: 66 MG/DL — LOW (ref 70–99)
HCT VFR BLD CALC: 42.1 % — SIGNIFICANT CHANGE UP (ref 39–50)
HCV AB S/CO SERPL IA: 0.04 S/CO — SIGNIFICANT CHANGE UP
HCV AB SERPL-IMP: SIGNIFICANT CHANGE UP
HGB BLD-MCNC: 14.4 G/DL — SIGNIFICANT CHANGE UP (ref 13–17)
MAGNESIUM SERPL-MCNC: 2 MG/DL — SIGNIFICANT CHANGE UP (ref 1.6–2.6)
MCHC RBC-ENTMCNC: 28.7 PG — SIGNIFICANT CHANGE UP (ref 27–34)
MCHC RBC-ENTMCNC: 34.2 GM/DL — SIGNIFICANT CHANGE UP (ref 32–36)
MCV RBC AUTO: 83.9 FL — SIGNIFICANT CHANGE UP (ref 80–100)
NRBC # BLD: 0 /100 WBCS — SIGNIFICANT CHANGE UP (ref 0–0)
PLATELET # BLD AUTO: 225 K/UL — SIGNIFICANT CHANGE UP (ref 150–400)
POTASSIUM SERPL-MCNC: 3.6 MMOL/L — SIGNIFICANT CHANGE UP (ref 3.5–5.3)
POTASSIUM SERPL-SCNC: 3.6 MMOL/L — SIGNIFICANT CHANGE UP (ref 3.5–5.3)
RBC # BLD: 5.02 M/UL — SIGNIFICANT CHANGE UP (ref 4.2–5.8)
RBC # FLD: 12.6 % — SIGNIFICANT CHANGE UP (ref 10.3–14.5)
SARS-COV-2 IGG+IGM SERPL QL IA: 35.4 INDEX — HIGH
SARS-COV-2 IGG+IGM SERPL QL IA: >250 U/ML — HIGH
SARS-COV-2 IGG+IGM SERPL QL IA: POSITIVE
SARS-COV-2 IGG+IGM SERPL QL IA: POSITIVE
SODIUM SERPL-SCNC: 139 MMOL/L — SIGNIFICANT CHANGE UP (ref 135–145)
WBC # BLD: 8.79 K/UL — SIGNIFICANT CHANGE UP (ref 3.8–10.5)
WBC # FLD AUTO: 8.79 K/UL — SIGNIFICANT CHANGE UP (ref 3.8–10.5)

## 2021-11-16 PROCEDURE — 85610 PROTHROMBIN TIME: CPT

## 2021-11-16 PROCEDURE — 83036 HEMOGLOBIN GLYCOSYLATED A1C: CPT

## 2021-11-16 PROCEDURE — 99239 HOSP IP/OBS DSCHRG MGMT >30: CPT

## 2021-11-16 PROCEDURE — C1894: CPT

## 2021-11-16 PROCEDURE — 83735 ASSAY OF MAGNESIUM: CPT

## 2021-11-16 PROCEDURE — C1874: CPT

## 2021-11-16 PROCEDURE — 80061 LIPID PANEL: CPT

## 2021-11-16 PROCEDURE — C1769: CPT

## 2021-11-16 PROCEDURE — 82962 GLUCOSE BLOOD TEST: CPT

## 2021-11-16 PROCEDURE — 82553 CREATINE MB FRACTION: CPT

## 2021-11-16 PROCEDURE — C1887: CPT

## 2021-11-16 PROCEDURE — 85027 COMPLETE CBC AUTOMATED: CPT

## 2021-11-16 PROCEDURE — C1725: CPT

## 2021-11-16 PROCEDURE — 85730 THROMBOPLASTIN TIME PARTIAL: CPT

## 2021-11-16 PROCEDURE — 86769 SARS-COV-2 COVID-19 ANTIBODY: CPT

## 2021-11-16 PROCEDURE — 36415 COLL VENOUS BLD VENIPUNCTURE: CPT

## 2021-11-16 PROCEDURE — 82550 ASSAY OF CK (CPK): CPT

## 2021-11-16 PROCEDURE — 80048 BASIC METABOLIC PNL TOTAL CA: CPT

## 2021-11-16 PROCEDURE — 80053 COMPREHEN METABOLIC PANEL: CPT

## 2021-11-16 PROCEDURE — 85025 COMPLETE CBC W/AUTO DIFF WBC: CPT

## 2021-11-16 PROCEDURE — 86803 HEPATITIS C AB TEST: CPT

## 2021-11-16 RX ORDER — POTASSIUM CHLORIDE 20 MEQ
40 PACKET (EA) ORAL ONCE
Refills: 0 | Status: COMPLETED | OUTPATIENT
Start: 2021-11-16 | End: 2021-11-16

## 2021-11-16 RX ORDER — ASPIRIN/CALCIUM CARB/MAGNESIUM 324 MG
1 TABLET ORAL
Qty: 30 | Refills: 11
Start: 2021-11-16 | End: 2022-11-10

## 2021-11-16 RX ORDER — CLOPIDOGREL BISULFATE 75 MG/1
1 TABLET, FILM COATED ORAL
Qty: 30 | Refills: 11
Start: 2021-11-16 | End: 2022-11-10

## 2021-11-16 RX ORDER — ATORVASTATIN CALCIUM 80 MG/1
1 TABLET, FILM COATED ORAL
Qty: 30 | Refills: 3
Start: 2021-11-16 | End: 2022-03-15

## 2021-11-16 RX ORDER — ATORVASTATIN CALCIUM 80 MG/1
1 TABLET, FILM COATED ORAL
Qty: 0 | Refills: 0 | DISCHARGE

## 2021-11-16 RX ORDER — ASPIRIN/CALCIUM CARB/MAGNESIUM 324 MG
1 TABLET ORAL
Qty: 0 | Refills: 0 | DISCHARGE

## 2021-11-16 RX ADMIN — Medication 40 MILLIEQUIVALENT(S): at 07:49

## 2021-11-16 NOTE — DISCHARGE NOTE PROVIDER - CARE PROVIDER_API CALL
Blu Saha  CARDIOVASCULAR DISEASE  [address] [place] [address]  Phone: [phone]  Fax: [phone]  Follow Up Time:

## 2021-11-16 NOTE — DISCHARGE NOTE PROVIDER - NSDCFUADDAPPT_GEN_ALL_CORE_FT
Please follow up with Dr. Saha in 1-2 week. Please call his office and make an appointment to see him.

## 2021-11-16 NOTE — DISCHARGE NOTE PROVIDER - NSDCCPCAREPLAN_GEN_ALL_CORE_FT
PRINCIPAL DISCHARGE DIAGNOSIS  Diagnosis: CAD (coronary artery disease)  Assessment and Plan of Treatment:   You underwent a cardiac catheterization 11/15/21 and the blockage in your proximal right coronary artery  was opened with stent placement.NEVER MISS A DOSE OF ASPIRIN OR PLAVIX; IF YOU DO, YOU ARE AT RISK OF YOUR STENT CLOSING AND HAVING A HEART ATTACK. DO NOT STOP THESE TWO MEDICATIONS UNLESS INSTRUCTED TO DO SO BY YOUR CARDIOLOGIST.  •	Your procedure was done through your wrist  •	You do not need to keep this area covered and you may shower  •	Please avoid any heavy lifting  (no more than 3 to 5 lbs) or strenuous activity for five days  •	If you develop any swelling, bleeding, hardening of the skin (hematoma formation), acute pain, numbness/tingling  in your arm, please contact your doctor immediately or call our 24/7 line: [phone]  We have provided you with a prescription for cardiac rehab which is medically supervised exercise program for your heart and has been shown to improve the quantity and quality of life of people with heart disease like yours. You should attend cardiac rehab 3 times per week for 12 weeks. We have provided you with a list of nearby facilities. Please call your insurance carrier to determine which of these facilities are covered under your plan.        SECONDARY DISCHARGE DIAGNOSES  Diagnosis: HTN (hypertension)  Assessment and Plan of Treatment: Hypertension, commonly called high blood pressure, is when the force of blood pumping through your arteries is too strong. Hypertension forces your heart to work harder to pump blood. Your arteries may become narrow or stiff. Having untreated or uncontrolled hypertension for a long period of time can cause heart attack, stroke, kidney disease, and other problems. If started on a medication, take exactly as prescribed by your health care professional. Maintain a healthy lifestyle and follow up with your primary care physician.  - Please continue to take home meds as listed.       Diagnosis: HLD (hyperlipidemia)  Assessment and Plan of Treatment: Please take Lipitor 40 mg daily.    Diagnosis: DM (diabetes mellitus)  Assessment and Plan of Treatment: •	If you are a diabetic and you take Metformin: DO NOT TAKE your Metformin for two days. This medication can interact with the contrast used during your procedure therefore we want to ensure the contrast has left your body prior to you restarting your Metformin.   o	Make sure you monitor your finger sticks and diet while you are not taking your Metformin!  - Please continue to take home med as listed.

## 2021-11-16 NOTE — DISCHARGE NOTE PROVIDER - NSDCMRMEDTOKEN_GEN_ALL_CORE_FT
Aspirin Enteric Coated 81 mg oral delayed release tablet: 1 tab(s) orally once a day  atorvastatin 20 mg oral tablet: 1 tab(s) orally once a day  Daily-Charis Men&#x27;s Formula oral tablet: 1 tab(s) orally once a day  fenofibrate 160 mg oral tablet: 1 tab(s) orally once a day  glimepiride 4 mg oral tablet: 1 tab(s) orally once a day  Jardiance 25 mg oral tablet: 1 tab(s) orally once a day (in the morning)  metFORMIN 500 mg oral tablet: 1 tab(s) orally 2 times a day  tadalafil 5 mg oral tablet: 1 tab(s) orally once a day, As Needed  Toprol-XL 25 mg oral tablet, extended release: 1 tab(s) orally once a day  Vitamin D2 1.25 mg (50,000 intl units) oral capsule: 1 cap(s) orally once a week   Aspirin Enteric Coated 81 mg oral delayed release tablet: 1 tab(s) orally once a day  cardiac rehabilitation: Cardiac rehabilitation 3 times weekly x 3 months  clopidogrel 75 mg oral tablet: 1 tab(s) orally once a day  Daily-Charis Men&#x27;s Formula oral tablet: 1 tab(s) orally once a day  fenofibrate 160 mg oral tablet: 1 tab(s) orally once a day  glimepiride 4 mg oral tablet: 1 tab(s) orally once a day  Jardiance 25 mg oral tablet: 1 tab(s) orally once a day (in the morning)  Lipitor 40 mg oral tablet: 1 tab(s) orally once a day (at bedtime)  metFORMIN 500 mg oral tablet: 1 tab(s) orally 2 times a day  tadalafil 5 mg oral tablet: 1 tab(s) orally once a day, As Needed  Toprol-XL 25 mg oral tablet, extended release: 1 tab(s) orally once a day  Vitamin D2 1.25 mg (50,000 intl units) oral capsule: 1 cap(s) orally once a week

## 2021-11-16 NOTE — DISCHARGE NOTE PROVIDER - HOSPITAL COURSE
60 y/o male w/ FHx CAD and PMHx HTN,, HLD, and type II DM who presented to cardiologist, Dr. Saha, c/o LAST upon ambulating 3-4 blocks occurring for 1-2 years but reports now is associated w/ non-radiating substernal chest pressure for the past year and improving w/ rest. Patient denies palpitations, syncope, PND/orthopnea, LE edema, fever, chills, nausea/vomiting/diarrhea, URI symptoms, recent illness, travel, or sick contacts. Per MD note, Stress Echocardiogram (09/2021) showed moderate hypokinesis appreciated in the mid-basal inferior walls at peak stress. Per MD note Echocardiogram revealed EF 55-60%, grade I diastolic dysfunction c/w impaired relaxation, moderate calcification of AV, mild AS, and mild MR/TR.  In light of patient's risk factors, CCS Class III anginal equivalent symptoms, and abnormal stress echocardiogram results, patient now presents for cardiac catheterization w/ possible intervention. Pt. admitted to cardiology service o/n for monitoring.     Pt. s/p cardaic catheterization 11/15/21: s/p PTCA/SAMUEL pRCA 95%, LAD/LCx mild luminal irregularities, EDP 10, EF normal. R TR access.  Pt. seen and examined at bedside today am. Pt. comfortable, denies any CP, SOB, dizziness, palpitations, R radial access site stable, no bleeding and hematoma noted, R radial pulse 2 +.  VSS. Labs stable o/n. home meds reviewed with Dr. Quinteros and pt. to be d/c on ASA 81 mg daily, Plavix 75 mg daily, toprol 25 mg daily, lipitor 40 mg daily. Pt. stable to be d/c as per Dr. Quinteros and to f/u with Dr. Saha in 1-2 week Patient has been given appropriate discharge instructions including medication regimen, access site management and follow up. Prescriptions have been e-prescribed to patient's preferred pharmacy        Cardiac Rehab (STEMI/NSTEMI/ACS/Unstable Angina/CHF/Post PCI):            *Education on benefits of Cardiac Rehab provided to patient: Yes/No         *Referral and Prescription Given for Cardiac Rehab : Yes/No.  If No, Why Not?         *Pt given list of locations & instructed to contact their insurance company to review list of participating providers: YES    Statin Prescribed (STEMI/NSTEMI/UA &/OR PCI this admission):  Yes/No; If No, Why Not? Patient has a statin allergy    DAPT: Prescriptions for Aspirin/Plavix/Brilinta/Effient e-prescribed to patient's pharmacy Yes/No__.

## 2021-11-16 NOTE — DISCHARGE NOTE PROVIDER - NSDCFUADDINST_GEN_ALL_CORE_FT
•	Your procedure was done through your r wrist  •	You do not need to keep this area covered and you may shower  •	Please avoid any heavy lifting  (no more than 3 to 5 lbs) or strenuous activity for five days  •	If you develop any swelling, bleeding, hardening of the skin (hematoma formation), acute pain, numbness/tingling  in your arm  please contact your doctor immediately or call our 24/7 line: [phone]

## 2021-11-19 DIAGNOSIS — E78.5 HYPERLIPIDEMIA, UNSPECIFIED: ICD-10-CM

## 2021-11-19 DIAGNOSIS — E11.9 TYPE 2 DIABETES MELLITUS WITHOUT COMPLICATIONS: ICD-10-CM

## 2021-11-19 DIAGNOSIS — I25.110 ATHEROSCLEROTIC HEART DISEASE OF NATIVE CORONARY ARTERY WITH UNSTABLE ANGINA PECTORIS: ICD-10-CM

## 2021-11-19 DIAGNOSIS — I25.10 ATHEROSCLEROTIC HEART DISEASE OF NATIVE CORONARY ARTERY WITHOUT ANGINA PECTORIS: ICD-10-CM

## 2021-11-19 DIAGNOSIS — I10 ESSENTIAL (PRIMARY) HYPERTENSION: ICD-10-CM

## 2021-11-23 ENCOUNTER — NON-APPOINTMENT (OUTPATIENT)
Age: 61
End: 2021-11-23

## 2021-11-24 ENCOUNTER — APPOINTMENT (OUTPATIENT)
Dept: HEART AND VASCULAR | Facility: CLINIC | Age: 61
End: 2021-11-24

## 2021-11-24 ENCOUNTER — NON-APPOINTMENT (OUTPATIENT)
Age: 61
End: 2021-11-24

## 2024-01-18 NOTE — H&P PST ADULT - NEGATIVE GENERAL GENITOURINARY SYMPTOMS
d/t epistasis  - monitor H/H  - will transfuse 1u PRBC to keep baseline Hgb elevated for transfer back to [facility]isted [facility]. no urine discoloration/no flank pain L/no flank pain R/no renal colic/no incontinence/no dysuria/no bladder infections/no gas in urine/no hematuria/normal urinary frequency/no nocturia/no urinary hesitancy
